# Patient Record
Sex: FEMALE | Race: WHITE | Employment: OTHER | URBAN - METROPOLITAN AREA
[De-identification: names, ages, dates, MRNs, and addresses within clinical notes are randomized per-mention and may not be internally consistent; named-entity substitution may affect disease eponyms.]

---

## 2017-03-23 ENCOUNTER — TELEPHONE (OUTPATIENT)
Dept: INTERNAL MEDICINE CLINIC | Facility: CLINIC | Age: 77
End: 2017-03-23

## 2017-03-23 DIAGNOSIS — I10 HTN, GOAL BELOW 140/90: Primary | ICD-10-CM

## 2017-03-23 DIAGNOSIS — N39.41 URGE INCONTINENCE: ICD-10-CM

## 2017-03-23 DIAGNOSIS — E78.6 LOW HDL (UNDER 40): ICD-10-CM

## 2017-03-23 NOTE — TELEPHONE ENCOUNTER
Spoke with patient and advised that labs have been ordered. Instructed to fast 8-10 hours prior to blood draw.

## 2017-03-23 NOTE — TELEPHONE ENCOUNTER
Pt coming in on 4/18 for AWV - she wants to know what labs she should do before and if she needs to fast?

## 2017-04-11 ENCOUNTER — LAB ENCOUNTER (OUTPATIENT)
Dept: LAB | Age: 77
End: 2017-04-11
Attending: INTERNAL MEDICINE
Payer: MEDICARE

## 2017-04-11 DIAGNOSIS — N39.41 URGE INCONTINENCE: ICD-10-CM

## 2017-04-11 DIAGNOSIS — E78.6 LOW HDL (UNDER 40): ICD-10-CM

## 2017-04-11 DIAGNOSIS — I10 HTN, GOAL BELOW 140/90: ICD-10-CM

## 2017-04-11 PROCEDURE — 36415 COLL VENOUS BLD VENIPUNCTURE: CPT

## 2017-04-11 PROCEDURE — 80061 LIPID PANEL: CPT

## 2017-04-11 PROCEDURE — 80053 COMPREHEN METABOLIC PANEL: CPT

## 2017-04-11 PROCEDURE — 81001 URINALYSIS AUTO W/SCOPE: CPT

## 2017-04-11 PROCEDURE — 85025 COMPLETE CBC W/AUTO DIFF WBC: CPT

## 2017-04-18 ENCOUNTER — OFFICE VISIT (OUTPATIENT)
Dept: INTERNAL MEDICINE CLINIC | Facility: CLINIC | Age: 77
End: 2017-04-18

## 2017-04-18 VITALS
TEMPERATURE: 98 F | DIASTOLIC BLOOD PRESSURE: 70 MMHG | HEART RATE: 69 BPM | BODY MASS INDEX: 33.39 KG/M2 | WEIGHT: 198 LBS | OXYGEN SATURATION: 98 % | SYSTOLIC BLOOD PRESSURE: 130 MMHG | HEIGHT: 64.5 IN | RESPIRATION RATE: 16 BRPM

## 2017-04-18 DIAGNOSIS — Z13.39 SCREENING FOR ALCOHOL PROBLEM: ICD-10-CM

## 2017-04-18 DIAGNOSIS — N39.41 URGE INCONTINENCE: ICD-10-CM

## 2017-04-18 DIAGNOSIS — Z23 NEED FOR PNEUMOCOCCAL VACCINATION: ICD-10-CM

## 2017-04-18 DIAGNOSIS — Z12.31 ENCOUNTER FOR SCREENING MAMMOGRAM FOR MALIGNANT NEOPLASM OF BREAST: ICD-10-CM

## 2017-04-18 DIAGNOSIS — I10 ESSENTIAL HYPERTENSION: ICD-10-CM

## 2017-04-18 DIAGNOSIS — Z00.00 ENCOUNTER FOR ANNUAL HEALTH EXAMINATION: ICD-10-CM

## 2017-04-18 DIAGNOSIS — Z13.31 DEPRESSION SCREENING: ICD-10-CM

## 2017-04-18 DIAGNOSIS — Z00.00 PHYSICAL EXAM, ANNUAL: Primary | ICD-10-CM

## 2017-04-18 DIAGNOSIS — E78.6 LOW HDL (UNDER 40): ICD-10-CM

## 2017-04-18 DIAGNOSIS — Z78.0 POSTMENOPAUSAL STATE: ICD-10-CM

## 2017-04-18 PROCEDURE — G0009 ADMIN PNEUMOCOCCAL VACCINE: HCPCS | Performed by: INTERNAL MEDICINE

## 2017-04-18 PROCEDURE — G0439 PPPS, SUBSEQ VISIT: HCPCS | Performed by: INTERNAL MEDICINE

## 2017-04-18 PROCEDURE — 90732 PPSV23 VACC 2 YRS+ SUBQ/IM: CPT | Performed by: INTERNAL MEDICINE

## 2017-04-18 PROCEDURE — G0444 DEPRESSION SCREEN ANNUAL: HCPCS | Performed by: INTERNAL MEDICINE

## 2017-04-18 PROCEDURE — G0442 ANNUAL ALCOHOL SCREEN 15 MIN: HCPCS | Performed by: INTERNAL MEDICINE

## 2017-04-18 RX ORDER — LOSARTAN POTASSIUM 100 MG/1
100 TABLET ORAL DAILY
Qty: 90 TABLET | Refills: 3 | Status: SHIPPED | OUTPATIENT
Start: 2017-04-18 | End: 2018-04-17

## 2017-04-18 NOTE — PATIENT INSTRUCTIONS
Eating Heart-Healthy Foods  Eating has a big impact on your heart health. In fact, eating healthier can improve several of your heart risks at once. For instance, it helps you manage weight, cholesterol, and blood pressure.  Here are ideas to help you shlomo Aim to make these foods staples of your diet. If you have diabetes, you may have different recommendations than what is listed here:  · Fruits and vegetable provide plenty of nutrients without a lot of calories.  At meals, fill half your plate with these fo · Choose ingredients that spice up your food without adding calories, fat, or sodium. Try these items: horseradish, hot sauce, lemon, mustard, nonfat salad dressings, and vinegar.  For salt-free herbs and spices, try basil, cilantro, cinnamon, pepper, and r 03/24/2014 157     TRIGLYCERIDES (mg/dL)   Date Value   04/11/2017 134   03/24/2014 91        EKG - covered if needed at Welcome to Medicare, and non-screening if indicated for medical reasons Electrocardiogram date Routine EKG is not a screening covered s Pap: Every 3 yrs age 21-65 or Pap+HPV every 5 yrs age 33-67, Covered every 2 yrs up to age 79 or Yearly if High Risk   There are no preventive care reminders to display for this patient.      Chlamydia  Annually if high risk No results found for: CHLAMYDIA Zoster (Not covered by Medicare Part B) No orders found for this or any previous visit. This may be covered with your pharmacy  prescription benefits     Recommended Websites for Advanced Directives    [email or web address]

## 2017-04-18 NOTE — PROGRESS NOTES
HPI:   Benjamin Frausto is a 78year old female who presents for a Medicare Subsequent Annual Wellness visit (Pt already had Initial Annual Wellness).     HPI:  Here for physical  No complaints  Tolerating meds  Annual Physical due on 04/18/2018        [facility] includes colonoscopy (10/28/2005); other surgical history (2010); appendectomy (2009); tonsillectomy; ; and hysterectomy (1998).     Her family history includes Carotid artery stenosis in her father; Diabetes mellitus in her brother; Bhavana Harder atraumatic   Eyes:  PERRL, conjunctiva/corneas clear, EOM's intact both eyes   Ears:  Normal TM's and external ear canals, both ears   Nose: Nares normal, septum midline,mucosa normal, no drainage or sinus tenderness   Throat: Lips, mucosa, and tongue norm S01.68/P28)    Encounter for screening mammogram for malignant neoplasm of breast  -     CELINA DIGITAL SCRN BILAT W/CAD (CPT=/63169); Future    Postmenopausal state  -     XR DEXA BONE DENSITOMETRY (CPT=77080); Future         Ms. Leni Street already takes a help    Managing money/bills: Able without help    Taking medications as prescribed: Able without help    Are you able to afford your medications?: Yes    Hearing Problems?: No     Functional Status     Hearing Problems?: No    Vision Problems? : Yes    Macy Ramos Fasting Blood Sugar (FSB)Annually   GLUCOSE (mg/dL)   Date Value   04/11/2017 91   03/24/2014 102*   ----------       Cardiovascular Disease Screening     LDL Annually LDL CHOLESTEROL (mg/dL)   Date Value   04/11/2017 93     LDL CHOLESTROL (mg/dL)   Date V Hemophiliacs who received Factor VIII or IX concentrates   Clients of institutions for the mentally retarded   Persons who live in the same house as a HepB virus carrier   Homosexual men   Illicit injectable drug abusers     Tetanus Toxoid  Only covered Rfl: 3   OXYBUTYNIN CHLORIDE ER 5 MG Oral Tablet 24 Hr TAKE ONE TABLET BY MOUTH ONCE DAILY Disp: 90 tablet Rfl: 1   Naproxen Sodium (ALEVE) 220 MG Oral Tab Take 1-2 tablets by mouth 2 (two) times daily as needed.  Disp:  Rfl:    Calcium Carbonate-Vitamin D

## 2017-05-19 ENCOUNTER — HOSPITAL ENCOUNTER (OUTPATIENT)
Dept: BONE DENSITY | Age: 77
Discharge: HOME | End: 2017-05-19
Attending: INTERNAL MEDICINE
Payer: MEDICARE

## 2017-05-19 ENCOUNTER — HOSPITAL ENCOUNTER (OUTPATIENT)
Dept: MAMMOGRAPHY | Age: 77
Discharge: HOME | End: 2017-05-19
Attending: INTERNAL MEDICINE
Payer: MEDICARE

## 2017-05-19 DIAGNOSIS — Z78.0 POSTMENOPAUSAL STATE: ICD-10-CM

## 2017-05-19 DIAGNOSIS — Z12.31 ENCOUNTER FOR SCREENING MAMMOGRAM FOR MALIGNANT NEOPLASM OF BREAST: ICD-10-CM

## 2017-05-19 PROCEDURE — 77080 DXA BONE DENSITY AXIAL: CPT | Performed by: INTERNAL MEDICINE

## 2017-05-19 PROCEDURE — 77067 SCR MAMMO BI INCL CAD: CPT | Performed by: INTERNAL MEDICINE

## 2017-06-25 DIAGNOSIS — N39.41 URGE INCONTINENCE: ICD-10-CM

## 2017-06-26 RX ORDER — OXYBUTYNIN CHLORIDE 5 MG/1
TABLET, EXTENDED RELEASE ORAL
Qty: 90 TABLET | Refills: 1 | Status: SHIPPED | OUTPATIENT
Start: 2017-06-26 | End: 2018-01-04

## 2017-07-19 ENCOUNTER — MED REC SCAN ONLY (OUTPATIENT)
Dept: INTERNAL MEDICINE CLINIC | Facility: CLINIC | Age: 77
End: 2017-07-19

## 2017-10-13 ENCOUNTER — NURSE ONLY (OUTPATIENT)
Dept: INTERNAL MEDICINE CLINIC | Facility: CLINIC | Age: 77
End: 2017-10-13

## 2017-10-13 DIAGNOSIS — Z23 FLU VACCINE NEED: Primary | ICD-10-CM

## 2017-10-13 PROCEDURE — 90686 IIV4 VACC NO PRSV 0.5 ML IM: CPT | Performed by: INTERNAL MEDICINE

## 2017-10-13 PROCEDURE — G0008 ADMIN INFLUENZA VIRUS VAC: HCPCS | Performed by: INTERNAL MEDICINE

## 2018-01-04 DIAGNOSIS — N39.41 URGE INCONTINENCE: ICD-10-CM

## 2018-01-04 RX ORDER — OXYBUTYNIN CHLORIDE 5 MG/1
TABLET, EXTENDED RELEASE ORAL
Qty: 90 TABLET | Refills: 1 | Status: SHIPPED | OUTPATIENT
Start: 2018-01-04 | End: 2018-06-24

## 2018-04-17 ENCOUNTER — OFFICE VISIT (OUTPATIENT)
Dept: INTERNAL MEDICINE CLINIC | Facility: CLINIC | Age: 78
End: 2018-04-17

## 2018-04-17 ENCOUNTER — LAB ENCOUNTER (OUTPATIENT)
Dept: LAB | Age: 78
End: 2018-04-17
Attending: INTERNAL MEDICINE
Payer: MEDICARE

## 2018-04-17 VITALS
DIASTOLIC BLOOD PRESSURE: 78 MMHG | HEART RATE: 65 BPM | OXYGEN SATURATION: 99 % | BODY MASS INDEX: 32.38 KG/M2 | HEIGHT: 64.5 IN | TEMPERATURE: 98 F | SYSTOLIC BLOOD PRESSURE: 124 MMHG | RESPIRATION RATE: 16 BRPM | WEIGHT: 192 LBS

## 2018-04-17 DIAGNOSIS — Z12.31 ENCOUNTER FOR SCREENING MAMMOGRAM FOR MALIGNANT NEOPLASM OF BREAST: ICD-10-CM

## 2018-04-17 DIAGNOSIS — E78.6 LOW HDL (UNDER 40): ICD-10-CM

## 2018-04-17 DIAGNOSIS — I10 ESSENTIAL HYPERTENSION: ICD-10-CM

## 2018-04-17 DIAGNOSIS — N39.41 URGE INCONTINENCE: ICD-10-CM

## 2018-04-17 DIAGNOSIS — Z00.00 ENCOUNTER FOR ANNUAL HEALTH EXAMINATION: ICD-10-CM

## 2018-04-17 DIAGNOSIS — H93.13 TINNITUS OF BOTH EARS: ICD-10-CM

## 2018-04-17 DIAGNOSIS — Z00.00 PHYSICAL EXAM, ANNUAL: Primary | ICD-10-CM

## 2018-04-17 PROCEDURE — 84443 ASSAY THYROID STIM HORMONE: CPT

## 2018-04-17 PROCEDURE — 85025 COMPLETE CBC W/AUTO DIFF WBC: CPT

## 2018-04-17 PROCEDURE — 36415 COLL VENOUS BLD VENIPUNCTURE: CPT

## 2018-04-17 PROCEDURE — 80061 LIPID PANEL: CPT

## 2018-04-17 PROCEDURE — G0439 PPPS, SUBSEQ VISIT: HCPCS | Performed by: INTERNAL MEDICINE

## 2018-04-17 PROCEDURE — 81001 URINALYSIS AUTO W/SCOPE: CPT

## 2018-04-17 PROCEDURE — 80053 COMPREHEN METABOLIC PANEL: CPT

## 2018-04-17 RX ORDER — LOSARTAN POTASSIUM 100 MG/1
100 TABLET ORAL DAILY
Qty: 90 TABLET | Refills: 3 | Status: SHIPPED | OUTPATIENT
Start: 2018-04-17 | End: 2019-05-26

## 2018-04-17 NOTE — PROGRESS NOTES
HPI:   Mary Lopez is a 78year old female who presents for a Medicare Subsequent Annual Wellness visit (Pt already had Initial Annual Wellness). HPI:  No complaints  Denies cp or sob  Has had some ringing in the ears for 2 weeks.  No allergy sxs no Cholesterol Labs:     Lab Results  Component Value Date   CHOLEST 165 04/11/2017   HDL 45 (L) 04/11/2017   LDL 93 04/11/2017   TRIG 134 04/11/2017          Last Chemistry Labs:     Lab Results  Component Value Date   AST 16 04/11/2017   ALT 20 04/11/2017 smokeless tobacco. She reports that she drinks alcohol. She reports that she does not use drugs.      REVIEW OF SYSTEMS:   GENERAL: feels well otherwise  SKIN: denies any unusual skin lesions  EYES: denies blurred vision or double vision  HEENT: denies nasa tenderness/mass/nodules; no carotid bruit or JVD   Back:   Symmetric, no curvature, ROM normal, no CVA tenderness   Lungs:   Clear to auscultation bilaterally, respirations unlabored   Heart:  Regular rate and rhythm, S1 and S2 normal, no murmur, rub, or g 40)- stable. monitor     Urge incontinence- stable on oxybutinin     Essential hypertension- controlled. Cont current med therapy. Check cmp     Tinnitus of both ears- cerumen removed.  Monitor for sxs improvement      The patient indicates understanding of found.    Glaucoma Screening      Ophthalmology Visit Annually: Diabetics, FHx Glaucoma, AA>50, > 65 No flowsheet data found.     Bone Density Screening      Dexascan Every two years Last Dexa Scan:   XR DEXA BONE DENSITOMETRY (CPT=77080) 05/19/2017 digoxin diuretics, anticonvulsants.)    Potassium  Annually Potassium (mmol/L)   Date Value   04/11/2017 3.9     POTASSIUM (mmol/L)   Date Value   03/24/2014 4.0   11/19/2012 4.0    No flowsheet data found.     Creatinine  Annually CREATININE (mg/dL)   Date

## 2018-05-22 ENCOUNTER — HOSPITAL ENCOUNTER (OUTPATIENT)
Dept: MAMMOGRAPHY | Age: 78
Discharge: HOME | End: 2018-05-22
Attending: INTERNAL MEDICINE
Payer: MEDICARE

## 2018-05-22 DIAGNOSIS — Z12.31 ENCOUNTER FOR SCREENING MAMMOGRAM FOR MALIGNANT NEOPLASM OF BREAST: ICD-10-CM

## 2018-05-22 PROCEDURE — 77067 SCR MAMMO BI INCL CAD: CPT | Performed by: INTERNAL MEDICINE

## 2018-06-24 DIAGNOSIS — N39.41 URGE INCONTINENCE: ICD-10-CM

## 2018-06-25 RX ORDER — OXYBUTYNIN CHLORIDE 5 MG/1
TABLET, EXTENDED RELEASE ORAL
Qty: 90 TABLET | Refills: 1 | Status: SHIPPED | OUTPATIENT
Start: 2018-06-25 | End: 2018-12-24

## 2018-10-05 ENCOUNTER — NURSE ONLY (OUTPATIENT)
Dept: INTERNAL MEDICINE CLINIC | Facility: CLINIC | Age: 78
End: 2018-10-05
Payer: MEDICARE

## 2018-10-05 DIAGNOSIS — Z23 NEED FOR INFLUENZA VACCINATION: Primary | ICD-10-CM

## 2018-10-05 PROCEDURE — G0008 ADMIN INFLUENZA VIRUS VAC: HCPCS | Performed by: INTERNAL MEDICINE

## 2018-10-05 PROCEDURE — 90653 IIV ADJUVANT VACCINE IM: CPT | Performed by: INTERNAL MEDICINE

## 2018-10-30 ENCOUNTER — OFFICE VISIT (OUTPATIENT)
Dept: INTERNAL MEDICINE CLINIC | Facility: CLINIC | Age: 78
End: 2018-10-30
Payer: MEDICARE

## 2018-10-30 VITALS
WEIGHT: 186 LBS | RESPIRATION RATE: 16 BRPM | HEART RATE: 63 BPM | DIASTOLIC BLOOD PRESSURE: 72 MMHG | HEIGHT: 64.5 IN | BODY MASS INDEX: 31.37 KG/M2 | SYSTOLIC BLOOD PRESSURE: 128 MMHG | TEMPERATURE: 98 F

## 2018-10-30 DIAGNOSIS — H81.13 BPPV (BENIGN PAROXYSMAL POSITIONAL VERTIGO), BILATERAL: ICD-10-CM

## 2018-10-30 DIAGNOSIS — H81.01 MENIERE'S DISEASE OF RIGHT EAR: Primary | ICD-10-CM

## 2018-10-30 PROCEDURE — 99213 OFFICE O/P EST LOW 20 MIN: CPT | Performed by: INTERNAL MEDICINE

## 2018-10-30 RX ORDER — TRIAMTERENE AND HYDROCHLOROTHIAZIDE 37.5; 25 MG/1; MG/1
1 CAPSULE ORAL EVERY MORNING
Qty: 90 CAPSULE | Refills: 1 | Status: SHIPPED | OUTPATIENT
Start: 2018-10-30 | End: 2019-04-20

## 2018-10-30 NOTE — PROGRESS NOTES
HPI:    Patient ID: Higinio Sebastian is a 70year old female. Dizziness   This is a recurrent problem. The current episode started more than 1 year ago. The problem occurs intermittently. The problem has been waxing and waning.  Pertinent negatives includ mouth. Disp:  Rfl:    Aspirin 81 MG Oral Tab Take 81 mg by mouth daily.  Disp:  Rfl:      Allergies:  Zocor [Simvastatin *    MYALGIA    Comment:Tabs  Zosyn                   RASH    Comment:SOLN   PHYSICAL EXAM:   Physical Exam   Constitutional: She appear

## 2018-10-30 NOTE — PATIENT INSTRUCTIONS
Meniere’s Disease    Meniere’s disease is a chronic recurring condition. It is due to a problem with the inner ear. This is the part of the ear responsible for balance as well as hearing.  Symptoms include:  · Sudden attacks of vertigo. Vertigo is a spinn If an attack is about to start or has started, take any medicine you have been prescribed for an attack. Lie down on a firm surface in a darkened room. Stay as still as possible. Stay away from bright light. Do not try to read or watch TV.  Don't get up unt · Choose low-salt snacks such as no-salt pretzels or crackers, air-popped popcorn, or low-fat frozen yogurt. · Don't use condiments high in sodium. These include mustard, relish, ketchup, soy sauce, and Worcestershire sauce.   · When you eat out, ask that · Antinausea (also called antiemetic) medicines. These help relieve nausea and vomiting. · Sedatives. These help you relax and sleep during a vertigo attack. Note: Attacks often cause nausea and vomiting.  So these drugs may be given in the form of a rect

## 2018-12-21 ENCOUNTER — TELEPHONE (OUTPATIENT)
Dept: INTERNAL MEDICINE CLINIC | Facility: CLINIC | Age: 78
End: 2018-12-21

## 2018-12-21 DIAGNOSIS — J06.9 VIRAL UPPER RESPIRATORY TRACT INFECTION: Primary | ICD-10-CM

## 2018-12-21 RX ORDER — AMOXICILLIN AND CLAVULANATE POTASSIUM 875; 125 MG/1; MG/1
1 TABLET, FILM COATED ORAL 2 TIMES DAILY
Qty: 20 TABLET | Refills: 0 | Status: SHIPPED | OUTPATIENT
Start: 2018-12-21 | End: 2018-12-31

## 2018-12-21 NOTE — TELEPHONE ENCOUNTER
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

## 2018-12-21 NOTE — TELEPHONE ENCOUNTER
Patient c/o sore throat, wet cough, chest congestion and tightness x3 days. Denies fever, N/V/D. Patient is concerned because her  has cancer and is immunosuppressed. She is afraid cough usually turns into bronchitis.

## 2018-12-21 NOTE — TELEPHONE ENCOUNTER
Dr. Aziza Darnell recommends Augmentin x10 days, tylenol/advil as needed for sore throat, rest and hydration. Should symptoms persist. Patient is to follow up in the office. Patient aware and agreed with plan.    Rx sent     Patient allergic to Zosyn when given

## 2018-12-23 DIAGNOSIS — N39.41 URGE INCONTINENCE: ICD-10-CM

## 2018-12-24 RX ORDER — OXYBUTYNIN CHLORIDE 5 MG/1
TABLET, EXTENDED RELEASE ORAL
Qty: 90 TABLET | Refills: 1 | Status: SHIPPED | OUTPATIENT
Start: 2018-12-24 | End: 2019-06-12

## 2019-03-06 ENCOUNTER — TELEPHONE (OUTPATIENT)
Dept: INTERNAL MEDICINE CLINIC | Facility: CLINIC | Age: 79
End: 2019-03-06

## 2019-03-06 DIAGNOSIS — E78.6 LOW HDL (UNDER 40): Primary | ICD-10-CM

## 2019-03-06 DIAGNOSIS — H81.01 MENIERE'S DISEASE OF RIGHT EAR: ICD-10-CM

## 2019-03-06 DIAGNOSIS — I10 ESSENTIAL HYPERTENSION: ICD-10-CM

## 2019-04-09 ENCOUNTER — LAB ENCOUNTER (OUTPATIENT)
Dept: LAB | Age: 79
End: 2019-04-09
Attending: INTERNAL MEDICINE
Payer: MEDICARE

## 2019-04-09 DIAGNOSIS — H81.01 MENIERE'S DISEASE OF RIGHT EAR: ICD-10-CM

## 2019-04-09 DIAGNOSIS — I10 ESSENTIAL HYPERTENSION: ICD-10-CM

## 2019-04-09 DIAGNOSIS — E78.6 LOW HDL (UNDER 40): ICD-10-CM

## 2019-04-09 PROCEDURE — 81001 URINALYSIS AUTO W/SCOPE: CPT

## 2019-04-09 PROCEDURE — 84443 ASSAY THYROID STIM HORMONE: CPT

## 2019-04-09 PROCEDURE — 85025 COMPLETE CBC W/AUTO DIFF WBC: CPT

## 2019-04-09 PROCEDURE — 36415 COLL VENOUS BLD VENIPUNCTURE: CPT

## 2019-04-09 PROCEDURE — 80061 LIPID PANEL: CPT

## 2019-04-09 PROCEDURE — 80053 COMPREHEN METABOLIC PANEL: CPT

## 2019-04-16 ENCOUNTER — OFFICE VISIT (OUTPATIENT)
Dept: INTERNAL MEDICINE CLINIC | Facility: CLINIC | Age: 79
End: 2019-04-16
Payer: MEDICARE

## 2019-04-16 VITALS
DIASTOLIC BLOOD PRESSURE: 76 MMHG | HEART RATE: 64 BPM | SYSTOLIC BLOOD PRESSURE: 122 MMHG | HEIGHT: 64.5 IN | BODY MASS INDEX: 32.38 KG/M2 | RESPIRATION RATE: 16 BRPM | WEIGHT: 192 LBS | TEMPERATURE: 98 F

## 2019-04-16 DIAGNOSIS — Z00.00 ENCOUNTER FOR ANNUAL HEALTH EXAMINATION: ICD-10-CM

## 2019-04-16 DIAGNOSIS — G25.81 RLS (RESTLESS LEGS SYNDROME): ICD-10-CM

## 2019-04-16 DIAGNOSIS — I10 ESSENTIAL HYPERTENSION: ICD-10-CM

## 2019-04-16 DIAGNOSIS — Z00.00 ANNUAL PHYSICAL EXAM: Primary | ICD-10-CM

## 2019-04-16 DIAGNOSIS — N39.41 URGE INCONTINENCE: ICD-10-CM

## 2019-04-16 DIAGNOSIS — H81.01 MENIERE'S DISEASE OF RIGHT EAR: ICD-10-CM

## 2019-04-16 DIAGNOSIS — Z12.31 ENCOUNTER FOR SCREENING MAMMOGRAM FOR MALIGNANT NEOPLASM OF BREAST: ICD-10-CM

## 2019-04-16 DIAGNOSIS — E78.6 LOW HDL (UNDER 40): ICD-10-CM

## 2019-04-16 PROBLEM — H93.13 TINNITUS OF BOTH EARS: Status: RESOLVED | Noted: 2018-04-17 | Resolved: 2019-04-16

## 2019-04-16 PROCEDURE — G0439 PPPS, SUBSEQ VISIT: HCPCS | Performed by: INTERNAL MEDICINE

## 2019-04-16 RX ORDER — ROPINIROLE 0.5 MG/1
0.5 TABLET, FILM COATED ORAL EVERY EVENING
Qty: 90 TABLET | Refills: 1 | Status: SHIPPED | OUTPATIENT
Start: 2019-04-16 | End: 2019-09-24

## 2019-04-16 NOTE — PROGRESS NOTES
HPI:   Higinio Sebastian is a 70year old female who presents for a Medicare Subsequent Annual Wellness visit (Pt already had Initial Annual Wellness). HPI:  Here for AWV  Reports continues vertigo and tinnitus despite diuretics.   Reports worsening urinar 186 lb  04/17/18 : 192 lb     Last Cholesterol Labs:   Lab Results   Component Value Date    CHOLEST 173 04/09/2019    HDL 53 04/09/2019     (H) 04/09/2019    TRIG 90 04/09/2019          Last Chemistry Labs:   Lab Results   Component Value Date    A Diabetes mellitus in her brother; MI in her father; Valvular heart disease, htn in her brother; crest syndrome in her mother. SOCIAL HISTORY:   She  reports that she has never smoked.  She has never used smokeless tobacco. She reports that she drinks alco mucosa, and tongue normal; teeth and gums normal   Neck: Supple, symmetrical, trachea midline, no adenopathy;  thyroid: not enlarged, symmetric, no tenderness/mass/nodules; no carotid bruit or JVD   Back:   Symmetric, no curvature, ROM normal, no CVA tende [facility] SCREENING BILAT (CPT=77067); Future         Diet assessment: good     PLAN:       Low HDL (under 40)- stable. monitor     Urge incontinence- refer to urogyne for eval     Essential hypertension- controlled.  Cont current med therapy     Meniere's disease found for this or any previous visit. No flowsheet data found. Fecal Occult Blood Annually No results found for: FOB No flowsheet data found.     Glaucoma Screening      Ophthalmology Visit Annually: Diabetics, FHx Glaucoma, AA>50, > 65 No flows SPECIFIC DISEASE MONITORING Internal Lab or Procedure External Lab or Procedure      Annual Monitoring of Persistent     Medications (ACE/ARB, digoxin diuretics, anticonvulsants.)    Potassium  Annually Potassium (mmol/L)   Date Value   04/09/2019 4.2 (restless legs syndrome)  Encounter for screening mammogram for malignant neoplasm of breast    No orders of the defined types were placed in this encounter.       Meds This Visit:  Requested Prescriptions     Signed Prescriptions Disp Refills   • rOPINIRol

## 2019-04-16 NOTE — PATIENT INSTRUCTIONS
[facility] SCREENING SCHEDULE   Tests on this list are recommended by your physician but may not be covered, or covered at this frequency, by your insurer. Please check with your insurance carrier before scheduling to verify coverage.    PREVENTATI • Men who are 73-68 years old and have smoked more than 100 cigarettes in their lifetime   • Anyone with a family history    Colorectal Cancer Screening  Covered up to Age 76     Colonoscopy Screen   Covered every 10 years- more often if abnormal Colonos Influenza  Covered Annually Orders placed or performed in visit on 10/13/17   • INFLUENZA VIRUS VACCINE, QUAD, PRESERVATIVE FREE, 0.5 ML   Orders placed or performed in visit on 10/07/16   • FLU VAC NO PRSV 4 JOVANY 3 YRS+   Orders placed or performed in visi to the [employer]. This site has a lot of good information including definitions of the different types of Advance Directives.  It also has the State forms available on it's website for anyone to review and print using their home compute

## 2019-04-20 DIAGNOSIS — H81.01 MENIERE'S DISEASE OF RIGHT EAR: ICD-10-CM

## 2019-04-22 RX ORDER — TRIAMTERENE AND HYDROCHLOROTHIAZIDE 37.5; 25 MG/1; MG/1
CAPSULE ORAL
Qty: 90 CAPSULE | Refills: 1 | Status: SHIPPED | OUTPATIENT
Start: 2019-04-22 | End: 2019-05-13 | Stop reason: ALTCHOICE

## 2019-04-30 ENCOUNTER — OFFICE VISIT (OUTPATIENT)
Dept: UROLOGY | Facility: HOSPITAL | Age: 79
End: 2019-04-30
Attending: OBSTETRICS & GYNECOLOGY
Payer: MEDICARE

## 2019-04-30 VITALS
DIASTOLIC BLOOD PRESSURE: 80 MMHG | SYSTOLIC BLOOD PRESSURE: 126 MMHG | HEIGHT: 64.5 IN | BODY MASS INDEX: 32.38 KG/M2 | WEIGHT: 192 LBS

## 2019-04-30 DIAGNOSIS — N39.3 FEMALE STRESS INCONTINENCE: ICD-10-CM

## 2019-04-30 DIAGNOSIS — N81.11 CYSTOCELE, MIDLINE: ICD-10-CM

## 2019-04-30 DIAGNOSIS — N81.84 PELVIC MUSCLE WASTING: ICD-10-CM

## 2019-04-30 DIAGNOSIS — R35.1 NOCTURIA: ICD-10-CM

## 2019-04-30 DIAGNOSIS — N39.41 URGE INCONTINENCE: Primary | ICD-10-CM

## 2019-04-30 DIAGNOSIS — N95.2 POSTMENOPAUSAL ATROPHIC VAGINITIS: ICD-10-CM

## 2019-04-30 DIAGNOSIS — K59.00 CONSTIPATION, UNSPECIFIED CONSTIPATION TYPE: ICD-10-CM

## 2019-04-30 PROCEDURE — 81003 URINALYSIS AUTO W/O SCOPE: CPT | Performed by: OBSTETRICS & GYNECOLOGY

## 2019-04-30 PROCEDURE — 87086 URINE CULTURE/COLONY COUNT: CPT | Performed by: OBSTETRICS & GYNECOLOGY

## 2019-04-30 PROCEDURE — 99201 HC OUTPT EVAL AND MGNT NEW PT LEVEL 1: CPT

## 2019-04-30 NOTE — PATIENT INSTRUCTIONS
STOP OXYBUTYNIN FOR [address]  [facility]  [address] [place] [address],[address]  Prasanna England   Office: [phone]  Fax: [phone]  [email or web address]    What is Urodynamic Testing?   Your physician or specia the bladder fills, the different pressure measurements will be recorded and you will be asked to cough and push or bear down to check for any leakage. You will also be asked about the way your bladder feels as it is filling.   Small sticky patches are ofte diarrhea.      _____Milk of Magnesia    Begin with 1 teaspoon every evening. This is a very low dose---approximately one-sixth of the typical dose. You may need to increase the amount depending on your results.       XX Miralax - as needed    Miralax is a

## 2019-04-30 NOTE — PROGRESS NOTES
Cindi Vargas DO  4/30/2019     Referred by Dr. Rozina Villaseñor    Patient presents with:   Incontinence: referred by Dr. Rozina Villaseñor, leaking urine with activity      HPI:  +CYNDI, some  +UUI  UUI > CYNDI  Nocturia x 1-2 (recently stopped diuretic and less nocturia)    hyst CHLORIDE ER 5 MG Oral Tablet 24 Hr TAKE 1 TABLET BY MOUTH ONCE DAILY Disp: 90 tablet Rfl: 1   losartan 100 MG Oral Tab Take 1 tablet (100 mg total) by mouth daily.  Disp: 90 tablet Rfl: 3   Naproxen Sodium (ALEVE) 220 MG Oral Tab Take 1-2 tablets by mouth 2 hypermobile    Impression/Plan:  (N39.41) Urge incontinence  (primary encounter diagnosis)  Plan: URINALYSIS, ROUTINE, URINE CULTURE, ROUTINE,         POCT URINALYSIS DIPSTICK    (N39.3) Female stress incontinence  Plan: URINALYSIS, ROUTINE, URINE CULTURE, discussed  Anterior repair  Mid-urethral slings (Trans Vaginal Taping, TOT, single-incision)    Pt verbalizes understanding of all above discussed information. All questions answered. She agrees to plan    Return for UDS (stop oxybutynin), follow up.     Oliverio

## 2019-05-06 ENCOUNTER — MED REC SCAN ONLY (OUTPATIENT)
Dept: INTERNAL MEDICINE CLINIC | Facility: CLINIC | Age: 79
End: 2019-05-06

## 2019-05-13 ENCOUNTER — TELEPHONE (OUTPATIENT)
Dept: INTERNAL MEDICINE CLINIC | Facility: CLINIC | Age: 79
End: 2019-05-13

## 2019-05-13 NOTE — TELEPHONE ENCOUNTER
TREVER pt stopped her Triamterene because she went and seen Dr. Anton Levin and he told her she does not have Meniere's disease, no need for a call back      Per Dr. Indy Kenney telephone encounter and MA's note ENT advised pt stop Triamterene.

## 2019-05-15 ENCOUNTER — NURSE ONLY (OUTPATIENT)
Dept: INTERNAL MEDICINE CLINIC | Facility: CLINIC | Age: 79
End: 2019-05-15
Payer: MEDICARE

## 2019-05-15 DIAGNOSIS — Z01.818 PRE-OP TESTING: Primary | ICD-10-CM

## 2019-05-15 DIAGNOSIS — I10 ESSENTIAL HYPERTENSION: ICD-10-CM

## 2019-05-15 PROBLEM — H81.01 MENIERE'S DISEASE OF RIGHT EAR: Status: RESOLVED | Noted: 2018-10-30 | Resolved: 2019-05-15

## 2019-05-15 PROCEDURE — 93000 ELECTROCARDIOGRAM COMPLETE: CPT | Performed by: NURSE PRACTITIONER

## 2019-05-15 NOTE — PROGRESS NOTES
EKG review- NSR, no specific ST elevation or depression-  ms, QRS 78 ms, QT 39ms.  Ok to proceed with operation

## 2019-05-22 PROBLEM — S86.012A RUPTURE OF LEFT ACHILLES TENDON, INITIAL ENCOUNTER: Status: ACTIVE | Noted: 2019-05-22

## 2019-05-26 DIAGNOSIS — I10 ESSENTIAL HYPERTENSION: ICD-10-CM

## 2019-05-28 RX ORDER — LOSARTAN POTASSIUM 100 MG/1
TABLET ORAL
Qty: 90 TABLET | Refills: 3 | Status: SHIPPED | OUTPATIENT
Start: 2019-05-28 | End: 2020-05-04

## 2019-06-12 DIAGNOSIS — N39.41 URGE INCONTINENCE: ICD-10-CM

## 2019-06-12 RX ORDER — OXYBUTYNIN CHLORIDE 5 MG/1
TABLET, EXTENDED RELEASE ORAL
Qty: 90 TABLET | Refills: 1 | Status: SHIPPED | OUTPATIENT
Start: 2019-06-12 | End: 2019-09-24 | Stop reason: ALTCHOICE

## 2019-08-14 ENCOUNTER — HOSPITAL ENCOUNTER (OUTPATIENT)
Dept: MAMMOGRAPHY | Age: 79
Discharge: HOME | End: 2019-08-14
Attending: INTERNAL MEDICINE
Payer: MEDICARE

## 2019-08-14 DIAGNOSIS — Z12.31 ENCOUNTER FOR SCREENING MAMMOGRAM FOR MALIGNANT NEOPLASM OF BREAST: ICD-10-CM

## 2019-08-14 PROCEDURE — 77063 BREAST TOMOSYNTHESIS BI: CPT | Performed by: INTERNAL MEDICINE

## 2019-08-14 PROCEDURE — 77067 SCR MAMMO BI INCL CAD: CPT | Performed by: INTERNAL MEDICINE

## 2019-09-11 ENCOUNTER — OFFICE VISIT (OUTPATIENT)
Dept: UROLOGY | Facility: HOSPITAL | Age: 79
End: 2019-09-11
Attending: OBSTETRICS & GYNECOLOGY
Payer: MEDICARE

## 2019-09-11 VITALS — HEIGHT: 65 IN | WEIGHT: 194 LBS | BODY MASS INDEX: 32.32 KG/M2

## 2019-09-11 DIAGNOSIS — N81.11 CYSTOCELE, MIDLINE: ICD-10-CM

## 2019-09-11 DIAGNOSIS — R35.1 NOCTURIA: ICD-10-CM

## 2019-09-11 DIAGNOSIS — N39.3 FEMALE STRESS INCONTINENCE: ICD-10-CM

## 2019-09-11 DIAGNOSIS — N39.41 URGE INCONTINENCE: Primary | ICD-10-CM

## 2019-09-11 LAB
CONTROL RUN WITHIN 24 HOURS?: YES
LEUKOCYTE ESTERASE URINE: NEGATIVE
NITRITE URINE: NEGATIVE

## 2019-09-11 PROCEDURE — 51741 ELECTRO-UROFLOWMETRY FIRST: CPT

## 2019-09-11 PROCEDURE — 51797 INTRAABDOMINAL PRESSURE TEST: CPT

## 2019-09-11 PROCEDURE — 51729 CYSTOMETROGRAM W/VP&UP: CPT

## 2019-09-11 PROCEDURE — 51784 ANAL/URINARY MUSCLE STUDY: CPT

## 2019-09-11 NOTE — PROCEDURES
Patient here for urodynamic testing. Procedure explained and confirmed by patient. See evaluation form for results. Both verbal and written discharge instructions were given.   Patient tolerated procedure well and will follow up with Dr. Keshav Coreas Catheter:     Fr 7 / tdoc   Infusion:  Water Rate 50 mL/min  Temp:  Room  Position:  [x]  Sit  []  Stand  []  Supine  First sensation:   13 mL  First desire to void:   58 mL  Strong desire to void:  96 mL  Maximum cystometric capacity:   187 mL  Detrusor A

## 2019-09-11 NOTE — PATIENT INSTRUCTIONS
[address]  [id number]  [facility]: [phone]  FAX: [phone]       Urodynamic Testing Discharge Instructions: There are NO dietary or activity restrictions.   You may resume

## 2019-09-12 PROBLEM — M25.561 RIGHT KNEE PAIN, UNSPECIFIED CHRONICITY: Status: ACTIVE | Noted: 2019-09-12

## 2019-09-12 PROBLEM — M54.50 LOW BACK PAIN, UNSPECIFIED BACK PAIN LATERALITY, UNSPECIFIED CHRONICITY, UNSPECIFIED WHETHER SCIATICA PRESENT: Status: ACTIVE | Noted: 2019-09-12

## 2019-09-12 PROBLEM — M25.551 RIGHT HIP PAIN: Status: ACTIVE | Noted: 2019-09-12

## 2019-09-23 ENCOUNTER — HOSPITAL ENCOUNTER (OUTPATIENT)
Dept: MRI IMAGING | Age: 79
Discharge: HOME | End: 2019-09-23
Attending: PHYSICIAN ASSISTANT
Payer: MEDICARE

## 2019-09-23 DIAGNOSIS — M54.16 LUMBAR RADICULOPATHY: ICD-10-CM

## 2019-09-23 DIAGNOSIS — M47.816 LUMBAR SPONDYLOSIS: ICD-10-CM

## 2019-09-23 DIAGNOSIS — M43.16 SPONDYLOLISTHESIS, LUMBAR REGION: ICD-10-CM

## 2019-09-23 PROCEDURE — 72148 MRI LUMBAR SPINE W/O DYE: CPT | Performed by: PHYSICIAN ASSISTANT

## 2019-09-24 ENCOUNTER — TELEPHONE (OUTPATIENT)
Dept: UROLOGY | Facility: HOSPITAL | Age: 79
End: 2019-09-24

## 2019-09-24 ENCOUNTER — OFFICE VISIT (OUTPATIENT)
Dept: UROLOGY | Facility: HOSPITAL | Age: 79
End: 2019-09-24
Attending: OBSTETRICS & GYNECOLOGY
Payer: MEDICARE

## 2019-09-24 VITALS
WEIGHT: 194 LBS | DIASTOLIC BLOOD PRESSURE: 90 MMHG | BODY MASS INDEX: 32.32 KG/M2 | SYSTOLIC BLOOD PRESSURE: 140 MMHG | HEIGHT: 65 IN

## 2019-09-24 DIAGNOSIS — N81.11 CYSTOCELE, MIDLINE: ICD-10-CM

## 2019-09-24 DIAGNOSIS — R33.9 INCOMPLETE BLADDER EMPTYING: ICD-10-CM

## 2019-09-24 DIAGNOSIS — N39.41 URGE INCONTINENCE: Primary | ICD-10-CM

## 2019-09-24 DIAGNOSIS — N32.81 DETRUSOR INSTABILITY: ICD-10-CM

## 2019-09-24 PROCEDURE — 99211 OFF/OP EST MAY X REQ PHY/QHP: CPT

## 2019-09-24 RX ORDER — TROSPIUM CHLORIDE ER 60 MG/1
60 CAPSULE ORAL DAILY
Qty: 90 CAPSULE | Refills: 3 | Status: SHIPPED | OUTPATIENT
Start: 2019-09-24 | End: 2021-01-26

## 2019-09-24 NOTE — PATIENT INSTRUCTIONS
Keep bowels moving  Stop oxybutynin, start trospium XR 60mg q AM  Double void  Pursue pelvic floor PT    [facility] [address] REGIMEN    Constipation can have detrimental effects on bladder function and can w

## 2019-09-24 NOTE — TELEPHONE ENCOUNTER
Spoke to [address] [place], [place] [address]y 74  For a prior [employer]. Ref.  # [id number] phone number to check status  [facility] will notify via fax 48-72 hours  Trospium chloride ER 60mg is non formulary, on formulary are myrbetriq ER,toviaz ER, Oxybutyn

## 2019-09-24 NOTE — PROGRESS NOTES
Pt presents w/ initial c/o UUI  Urodynamic testing undergone without complication.   Results reviewed with patient  10/200cc & 266/100cc  [facility] 187cc  DO @ 97cc  No CYNDI    Discussed with patient mgmt options for DO, incomplete bladder emptying  Taking oxybuty

## 2019-10-02 ENCOUNTER — TELEPHONE (OUTPATIENT)
Dept: UROLOGY | Facility: HOSPITAL | Age: 79
End: 2019-10-02

## 2019-10-02 NOTE — TELEPHONE ENCOUNTER
Called pt to verify her prior authorization went through and was able to get her medication. Pt states she did receive authorization for the medication and received it this past Monday and started taking it right away Monday.    Instructed to call with any

## 2019-10-09 ENCOUNTER — OFFICE VISIT (OUTPATIENT)
Dept: INTERNAL MEDICINE CLINIC | Facility: CLINIC | Age: 79
End: 2019-10-09
Payer: MEDICARE

## 2019-10-09 VITALS
TEMPERATURE: 98 F | HEART RATE: 59 BPM | HEIGHT: 64 IN | RESPIRATION RATE: 16 BRPM | WEIGHT: 199 LBS | DIASTOLIC BLOOD PRESSURE: 80 MMHG | BODY MASS INDEX: 33.97 KG/M2 | SYSTOLIC BLOOD PRESSURE: 130 MMHG

## 2019-10-09 DIAGNOSIS — Z23 NEED FOR INFLUENZA VACCINATION: ICD-10-CM

## 2019-10-09 DIAGNOSIS — I10 ESSENTIAL HYPERTENSION: Primary | ICD-10-CM

## 2019-10-09 PROBLEM — M51.36 DDD (DEGENERATIVE DISC DISEASE), LUMBAR: Status: ACTIVE | Noted: 2019-10-09

## 2019-10-09 PROBLEM — M25.561 RIGHT KNEE PAIN, UNSPECIFIED CHRONICITY: Status: RESOLVED | Noted: 2019-09-12 | Resolved: 2019-10-09

## 2019-10-09 PROBLEM — M51.369 DDD (DEGENERATIVE DISC DISEASE), LUMBAR: Status: ACTIVE | Noted: 2019-10-09

## 2019-10-09 PROBLEM — M25.551 RIGHT HIP PAIN: Status: RESOLVED | Noted: 2019-09-12 | Resolved: 2019-10-09

## 2019-10-09 PROBLEM — M54.50 LOW BACK PAIN, UNSPECIFIED BACK PAIN LATERALITY, UNSPECIFIED CHRONICITY, UNSPECIFIED WHETHER SCIATICA PRESENT: Status: RESOLVED | Noted: 2019-09-12 | Resolved: 2019-10-09

## 2019-10-09 PROCEDURE — G0008 ADMIN INFLUENZA VIRUS VAC: HCPCS | Performed by: INTERNAL MEDICINE

## 2019-10-09 PROCEDURE — 90662 IIV NO PRSV INCREASED AG IM: CPT | Performed by: INTERNAL MEDICINE

## 2019-10-09 PROCEDURE — 99213 OFFICE O/P EST LOW 20 MIN: CPT | Performed by: INTERNAL MEDICINE

## 2019-10-09 NOTE — PROGRESS NOTES
HPI:    Patient ID: Jorge Narayan is a 70year old female. Hypertension       Jorge Narayan is a 70year old female. HPI:   Patient presents for recheck of her hypertension.  Pt has been taking medications as instructed, no medication side effect Diagnosis Date   • Actinic keratosis    • Displacement of lumbar intervertebral disc without myelopathy     L3-L4   • Hypertonicity of bladder    • Microscopic hematuria    • Osteoarthritis    • Prediabetes    • Unspecified hemorrhoids with other complic clear  NECK: supple,no adenopathy,no bruits  LUNGS: clear to auscultation  CARDIO: RRR without murmur  GI: good BS's,no masses, HSM or tenderness  EXTREMITIES: no cyanosis, clubbing or edema    ASSESSMENT AND PLAN:   Pt presents for a recheck of her hypert

## 2019-10-21 ENCOUNTER — MED REC SCAN ONLY (OUTPATIENT)
Dept: INTERNAL MEDICINE CLINIC | Facility: CLINIC | Age: 79
End: 2019-10-21

## 2019-10-22 PROBLEM — M48.061 LUMBAR FORAMINAL STENOSIS: Status: ACTIVE | Noted: 2019-10-22

## 2020-01-09 PROBLEM — M54.16 LUMBAR RADICULITIS: Status: ACTIVE | Noted: 2020-01-09

## 2020-01-09 PROBLEM — M25.561 RECURRENT PAIN OF RIGHT KNEE: Status: ACTIVE | Noted: 2019-09-12

## 2020-01-27 ENCOUNTER — OFFICE VISIT (OUTPATIENT)
Dept: INTERNAL MEDICINE CLINIC | Facility: CLINIC | Age: 80
End: 2020-01-27
Payer: MEDICARE

## 2020-01-27 VITALS
SYSTOLIC BLOOD PRESSURE: 130 MMHG | TEMPERATURE: 98 F | BODY MASS INDEX: 34.49 KG/M2 | WEIGHT: 202 LBS | RESPIRATION RATE: 18 BRPM | HEIGHT: 64 IN | HEART RATE: 94 BPM | OXYGEN SATURATION: 96 % | DIASTOLIC BLOOD PRESSURE: 60 MMHG

## 2020-01-27 DIAGNOSIS — J18.9 ATYPICAL PNEUMONIA: Primary | ICD-10-CM

## 2020-01-27 DIAGNOSIS — J98.01 BRONCHOSPASM: ICD-10-CM

## 2020-01-27 PROBLEM — M54.16 LUMBAR RADICULITIS: Status: RESOLVED | Noted: 2020-01-09 | Resolved: 2020-01-27

## 2020-01-27 PROCEDURE — 99214 OFFICE O/P EST MOD 30 MIN: CPT | Performed by: INTERNAL MEDICINE

## 2020-01-27 RX ORDER — PREDNISONE 20 MG/1
TABLET ORAL
Qty: 14 TABLET | Refills: 0 | Status: SHIPPED | OUTPATIENT
Start: 2020-01-27 | End: 2020-04-28 | Stop reason: ALTCHOICE

## 2020-01-27 RX ORDER — ALBUTEROL SULFATE 90 UG/1
2 AEROSOL, METERED RESPIRATORY (INHALATION) EVERY 6 HOURS
Qty: 1 INHALER | Refills: 0 | Status: SHIPPED | OUTPATIENT
Start: 2020-01-27 | End: 2020-04-28 | Stop reason: ALTCHOICE

## 2020-01-27 RX ORDER — AMOXICILLIN 875 MG/1
875 TABLET, COATED ORAL 2 TIMES DAILY
Qty: 20 TABLET | Refills: 0 | Status: SHIPPED | OUTPATIENT
Start: 2020-01-27 | End: 2020-04-28 | Stop reason: ALTCHOICE

## 2020-01-27 NOTE — PROGRESS NOTES
HPI:    Patient ID: Marilia Bowling is a 70year old female. Cough   This is a new problem. The current episode started 1 to 4 weeks ago. The problem has been gradually worsening. The problem occurs every few minutes.  The cough is productive of purulent daily as needed. • Calcium Carbonate-Vitamin D (CALCIUM + D) 600-200 MG-UNIT Oral Tab Take 1 tablet by mouth daily. • Multiple Vitamin (MULTIVITAMINS OR) Take  by mouth. • Aspirin 81 MG Oral Tab Take 81 mg by mouth daily.        Allergies:  Zo Referrals:  None       #[id number]

## 2020-01-28 ENCOUNTER — OFFICE VISIT (OUTPATIENT)
Dept: PHYSICAL THERAPY | Age: 80
End: 2020-01-28
Attending: OBSTETRICS & GYNECOLOGY
Payer: MEDICARE

## 2020-01-28 ENCOUNTER — TELEPHONE (OUTPATIENT)
Dept: UROLOGY | Facility: HOSPITAL | Age: 80
End: 2020-01-28

## 2020-01-28 DIAGNOSIS — R33.9 INCOMPLETE BLADDER EMPTYING: ICD-10-CM

## 2020-01-28 DIAGNOSIS — N81.11 CYSTOCELE, MIDLINE: ICD-10-CM

## 2020-01-28 DIAGNOSIS — N39.41 URGE INCONTINENCE: ICD-10-CM

## 2020-01-28 DIAGNOSIS — N32.81 DETRUSOR INSTABILITY OF BLADDER: ICD-10-CM

## 2020-01-28 DIAGNOSIS — N39.41 URGE INCONTINENCE: Primary | ICD-10-CM

## 2020-01-28 PROCEDURE — 97161 PT EVAL LOW COMPLEX 20 MIN: CPT

## 2020-01-28 PROCEDURE — 97110 THERAPEUTIC EXERCISES: CPT

## 2020-01-28 NOTE — PROGRESS NOTES
MUSCULOSKELETAL AND PELVIC FLOOR EVALUATION:   Referring Physician: Dr. 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)  Cystocele, midline (N81.11)  Incomplete bladder emptying (R33.9)  Date of Service: 1/28/2 symptoms: stress incontinence and urge incontinence  Events associated with the onset of urinary complaints: urgency, frequency, urge UI with full bladder, CYNDI with full bladder including cough, laugh, sneeze and lifting  Abdominal/Vaginal Pressure complai WNL  Introitus: WNL  Perineal body: WNL    Sensory/Reflex:  Vestibule: normal bilaterally  Anal Hill: normal bilaterally    Internal Examination   Scar: Not significant     Pelvic Floor Muscle strength: (PERF= Power/Endurance/Reps/Fast) MMT: 2+/5/10 /Not p agreed to actively participate in planning and for this course of care. Thank you for your referral. Please co-sign or sign and return this letter via fax as soon as possible to [phone].  If you have any questions, please contact me at Dept: [phone]

## 2020-01-30 ENCOUNTER — OFFICE VISIT (OUTPATIENT)
Dept: PHYSICAL THERAPY | Age: 80
End: 2020-01-30
Attending: OBSTETRICS & GYNECOLOGY
Payer: MEDICARE

## 2020-01-30 DIAGNOSIS — N39.41 URGE INCONTINENCE: ICD-10-CM

## 2020-01-30 DIAGNOSIS — N81.11 CYSTOCELE, MIDLINE: ICD-10-CM

## 2020-01-30 DIAGNOSIS — R33.9 INCOMPLETE BLADDER EMPTYING: ICD-10-CM

## 2020-01-30 DIAGNOSIS — N32.81 DETRUSOR INSTABILITY: ICD-10-CM

## 2020-01-30 PROCEDURE — 97110 THERAPEUTIC EXERCISES: CPT

## 2020-01-30 PROCEDURE — 97112 NEUROMUSCULAR REEDUCATION: CPT

## 2020-01-30 NOTE — PROGRESS NOTES
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 Governor Rojas  Next MD visit: none HEP: 1/30/2020 : PF isometric.  PF pre-activation : hip add thomas, hip ER seated    Charges:  Ex2 ( 30 min) NMRED 1 ( 15 min)        Total Timed Treatment: 45 min  Total Treatment Time: 45 min         MUSCULOSKELETAL AND PELVIC FLOOR EVALUATION:   Referring Past medical history was reviewed with Raya.  Significant findings include atypical pneumonia and bronchospasm; LBP; hemorrhoids, prediabetes, OA    Precautions:  Drug Allergy    URINARY HABITS  Types of symptoms: stress incontinence and urge incontinence Voluntary contraction: present   Voluntary relaxation: present  Involuntary contraction: present  Involuntary relaxation: present    Mons pubis: WNL  Labia majora: WNL  Labia minora:  WNL  Urethral meatus: WNL  Introitus: WNL  Perineal body: WNL    Sensory/ Frequency / Duration: Patient will be seen for 2 x/week  For first two week and then progress to 1 x week or a total of 8 visits over a 90 day period.   Treatment will include: Neuromuscular Re-education, Therapeutic Activities, Therapeutic Exercise and Jorje

## 2020-02-04 ENCOUNTER — OFFICE VISIT (OUTPATIENT)
Dept: PHYSICAL THERAPY | Age: 80
End: 2020-02-04
Attending: OBSTETRICS & GYNECOLOGY
Payer: MEDICARE

## 2020-02-04 DIAGNOSIS — N32.81 DETRUSOR INSTABILITY: ICD-10-CM

## 2020-02-04 DIAGNOSIS — N39.41 URGE INCONTINENCE: ICD-10-CM

## 2020-02-04 DIAGNOSIS — R33.9 INCOMPLETE BLADDER EMPTYING: ICD-10-CM

## 2020-02-04 DIAGNOSIS — N81.11 CYSTOCELE, MIDLINE: ICD-10-CM

## 2020-02-04 PROCEDURE — 97112 NEUROMUSCULAR REEDUCATION: CPT

## 2020-02-04 NOTE — PROGRESS NOTES
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 Oleg Gomez  Next MD visit: none Patient will demonstrate pelvic floor contraction average 10.0 mvc in 10 sec and 10 reps to decrease urinary incontinence.        Plan: Patient will continue with PT neuromuscular re-education for to improve pelvic floor awareness and apply urge deferment e Yolanda Alston is a 70year old female  who presents to therapy today with complaints of urgency, frequency, urge UI and some CYNDI. She noticed some improvements since starting Tropsium. Her voiding intervals improved and less urgency events.  She still fee Episodes of Leakage: Can be unknown, wetness found on panty liner  Amount of leakage: Small. Rare large amount.    Pad use: panty liner  Pad Change frequency: Couple times a day  Patient instructed in bladder diary  Do you ever leak urine without knowing it Pelvic Floor Muscle strength: (PERF= Power/Endurance/Reps/Fast) MMT: 2+/5/10 /Not performed   External Anal Sphincter: not tested at this time  Accessory Muscle Use: gluteals, adductors, abdominals    Tissue Laxity Test:  Anterior Wall: WNL  Posterior Wall Thank you for your referral. Please co-sign or sign and return this letter via fax as soon as possible to [phone].  If you have any questions, please contact me at Dept: [phone]    Sincerely,  Electronically signed by therapist: Anjum Delaney PT

## 2020-02-06 ENCOUNTER — OFFICE VISIT (OUTPATIENT)
Dept: PHYSICAL THERAPY | Age: 80
End: 2020-02-06
Attending: OBSTETRICS & GYNECOLOGY
Payer: MEDICARE

## 2020-02-06 DIAGNOSIS — N32.81 DETRUSOR INSTABILITY: ICD-10-CM

## 2020-02-06 DIAGNOSIS — R33.9 INCOMPLETE BLADDER EMPTYING: ICD-10-CM

## 2020-02-06 DIAGNOSIS — N39.41 URGE INCONTINENCE: ICD-10-CM

## 2020-02-06 DIAGNOSIS — N81.11 CYSTOCELE, MIDLINE: ICD-10-CM

## 2020-02-06 PROCEDURE — 97112 NEUROMUSCULAR REEDUCATION: CPT

## 2020-02-06 NOTE — PROGRESS NOTES
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 Mono Cooper  Next MD visit: none PF isometric 10 sec x5 rest 10 sec in between, 30 sec rest between sets 4 sets  Instruct hygiene steps for sensor. TE:  PF facilitation hip add thomas 10 sec x 10  PF facilitation hip ER green x 20 seated. Instruct HEP and issue HO.      NMRED  See above fo BM: History of constipation since a child. Patient had improvements with starting Benefiber. Constipation with [place] Stool Scale 1 with diet change on cruise ship. Patient recently returned from trip and has restored  normal diet.  Stool yesterday was [place] Constanza Roman presents to physical therapy evaluation with primary c/o urge UI, frequency, urgency, CYNDI and FI. The results of the objective tests and measures show poor bladder habits and pelvic floor weakness.   Functional deficits include but are not limited to Patient education was provided on objective findings of external and internal evaluation and expectations with treatment outcomes.  Educated on instructed in bladder, bowel, and diet diary log and issued handout     Patient was instructed in and issued a HE Certification From: 1/34/7403  To:4/27/2020

## 2020-02-11 ENCOUNTER — OFFICE VISIT (OUTPATIENT)
Dept: PHYSICAL THERAPY | Age: 80
End: 2020-02-11
Attending: OBSTETRICS & GYNECOLOGY
Payer: MEDICARE

## 2020-02-11 DIAGNOSIS — N81.11 CYSTOCELE, MIDLINE: ICD-10-CM

## 2020-02-11 DIAGNOSIS — N39.41 URGE INCONTINENCE: ICD-10-CM

## 2020-02-11 DIAGNOSIS — R33.9 INCOMPLETE BLADDER EMPTYING: ICD-10-CM

## 2020-02-11 DIAGNOSIS — N32.81 DETRUSOR INSTABILITY: ICD-10-CM

## 2020-02-11 PROCEDURE — 97112 NEUROMUSCULAR REEDUCATION: CPT

## 2020-02-11 NOTE — PROGRESS NOTES
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 Surinder Nice MD visit: none TX#: 4/ Date: 2/11/2020                   TX#: 5/ Date: Tx#: 6/   NMRED:  PF isometric 10 sec x5 rest 10 sec in between, 30 sec rest between sets 4 sets  Instruct hygiene steps for sensor.   TE:  PF facilitation hip add thomas 10 sec x 10  PF facilitation h Benjamin Frausto is a 70year old female  who presents to therapy today with complaints of urgency, frequency, urge UI and some CYNDI. She noticed some improvements since starting Tropsium. Her voiding intervals improved and less urgency events.  She still fee Episodes of Leakage: Can be unknown, wetness found on panty liner  Amount of leakage: Small. Rare large amount.    Pad use: panty liner  Pad Change frequency: Couple times a day  Patient instructed in bladder diary  Do you ever leak urine without knowing it Pelvic Floor Muscle strength: (PERF= Power/Endurance/Reps/Fast) MMT: 2+/5/10 /Not performed   External Anal Sphincter: not tested at this time  Accessory Muscle Use: gluteals, adductors, abdominals    Tissue Laxity Test:  Anterior Wall: WNL  Posterior Wall Thank you for your referral. Please co-sign or sign and return this letter via fax as soon as possible to [phone].  If you have any questions, please contact me at Dept: [phone]    Sincerely,  Electronically signed by therapist: Simon Vo PT

## 2020-02-13 ENCOUNTER — APPOINTMENT (OUTPATIENT)
Dept: PHYSICAL THERAPY | Age: 80
End: 2020-02-13
Attending: OBSTETRICS & GYNECOLOGY
Payer: MEDICARE

## 2020-02-18 ENCOUNTER — OFFICE VISIT (OUTPATIENT)
Dept: PHYSICAL THERAPY | Age: 80
End: 2020-02-18
Attending: OBSTETRICS & GYNECOLOGY
Payer: MEDICARE

## 2020-02-18 DIAGNOSIS — R33.9 INCOMPLETE BLADDER EMPTYING: ICD-10-CM

## 2020-02-18 DIAGNOSIS — N32.81 DETRUSOR INSTABILITY: ICD-10-CM

## 2020-02-18 DIAGNOSIS — N81.11 CYSTOCELE, MIDLINE: ICD-10-CM

## 2020-02-18 DIAGNOSIS — N39.41 URGE INCONTINENCE: ICD-10-CM

## 2020-02-18 PROCEDURE — 97110 THERAPEUTIC EXERCISES: CPT

## 2020-02-18 NOTE — PROGRESS NOTES
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 Gabrielle Ewing  Next MD visit: none PF isometric 10 sec x5 rest 10 sec in between, 30 sec rest between sets 4 sets  Instruct hygiene steps for sensor. TE:  PF facilitation hip add thomas 10 sec x 10  PF facilitation hip ER green x 20 seated. Instruct HEP and issue HO.      NMRED  See above fo Lesli Uribe is a 70year old female  who presents to therapy today with complaints of urgency, frequency, urge UI and some CYNDI. She noticed some improvements since starting Tropsium. Her voiding intervals improved and less urgency events.  She still fee Episodes of Leakage: Can be unknown, wetness found on panty liner  Amount of leakage: Small. Rare large amount.    Pad use: panty liner  Pad Change frequency: Couple times a day  Patient instructed in bladder diary  Do you ever leak urine without knowing it Pelvic Floor Muscle strength: (PERF= Power/Endurance/Reps/Fast) MMT: 2+/5/10 /Not performed   External Anal Sphincter: not tested at this time  Accessory Muscle Use: gluteals, adductors, abdominals    Tissue Laxity Test:  Anterior Wall: WNL  Posterior Wall Thank you for your referral. Please co-sign or sign and return this letter via fax as soon as possible to [phone].  If you have any questions, please contact me at Dept: [phone]    Sincerely,  Electronically signed by therapist: Kathie Wallace PT

## 2020-02-20 ENCOUNTER — APPOINTMENT (OUTPATIENT)
Dept: PHYSICAL THERAPY | Age: 80
End: 2020-02-20
Attending: OBSTETRICS & GYNECOLOGY
Payer: MEDICARE

## 2020-02-27 ENCOUNTER — OFFICE VISIT (OUTPATIENT)
Dept: PHYSICAL THERAPY | Age: 80
End: 2020-02-27
Attending: OBSTETRICS & GYNECOLOGY
Payer: MEDICARE

## 2020-02-27 PROCEDURE — 97110 THERAPEUTIC EXERCISES: CPT

## 2020-02-27 NOTE — PROGRESS NOTES
Discharge Summary    Dear Dr. Lulu Puente,    Pt has attended 7, cancelled 0, and no shown 0 visits in Physical Therapy. Subjective: Patient reports she is very happy with her progress.  She has increase voiding time to 1.5 -3  Hours depending on how killian Thank you for your referral. If you have any questions, please contact me at Dept: [phone].     Sincerely,  Electronically signed by therapist: Sina Arenas PT    [de-identified] certification required: Yes  Please co-sign or sign and return this letter v PF pre-activation side stepping yellow band added 2 steps at a time x 6 laps   TE:  Establish and perform final HEP  PF pre-activation forward lunge arms overhead x 10 alternating  Forward lunge diagonal x 10  Squat with arm raises x 10  Side stepping to c Pt describes pain level:  No Complaints of pain    Pregnant Now: No  Obstetrical/Gynecological history: 2 vaginal deliveries with episiotomies. Hysterectomy in 1997 due to uterine prolapse.    Urodynamic Test: Yes, see chart  Occupation/Activities: retired [employer] presents to physical therapy evaluation with primary c/o urge UI, frequency, urgency, CYNDI and FI. The results of the objective tests and measures show poor bladder habits and pelvic floor weakness.   Functional deficits include but are not limited to Patient education was provided on objective findings of external and internal evaluation and expectations with treatment outcomes.  Educated on instructed in bladder, bowel, and diet diary log and issued handout     Patient was instructed in and issued a HE Certification From: 5/81/2340  To:4/27/2020

## 2020-03-26 ENCOUNTER — TELEPHONE (OUTPATIENT)
Dept: INTERNAL MEDICINE CLINIC | Facility: CLINIC | Age: 80
End: 2020-03-26

## 2020-03-26 DIAGNOSIS — I10 ESSENTIAL HYPERTENSION: Primary | ICD-10-CM

## 2020-03-26 DIAGNOSIS — E78.6 LOW HDL (UNDER 40): ICD-10-CM

## 2020-03-26 DIAGNOSIS — N39.41 URGE INCONTINENCE: ICD-10-CM

## 2020-04-28 ENCOUNTER — VIRTUAL PHONE E/M (OUTPATIENT)
Dept: UROLOGY | Facility: HOSPITAL | Age: 80
End: 2020-04-28
Attending: OBSTETRICS & GYNECOLOGY
Payer: MEDICARE

## 2020-04-28 VITALS — HEIGHT: 64 IN | BODY MASS INDEX: 34.49 KG/M2 | WEIGHT: 202 LBS

## 2020-04-28 DIAGNOSIS — R33.9 INCOMPLETE BLADDER EMPTYING: ICD-10-CM

## 2020-04-28 DIAGNOSIS — N32.81 DETRUSOR INSTABILITY OF BLADDER: ICD-10-CM

## 2020-04-28 DIAGNOSIS — N81.11 CYSTOCELE, MIDLINE: ICD-10-CM

## 2020-04-28 DIAGNOSIS — N39.3 FEMALE STRESS INCONTINENCE: ICD-10-CM

## 2020-04-28 DIAGNOSIS — R35.1 NOCTURIA: ICD-10-CM

## 2020-04-28 DIAGNOSIS — N81.84 PELVIC MUSCLE WASTING: ICD-10-CM

## 2020-04-28 DIAGNOSIS — N39.41 URGE INCONTINENCE: Primary | ICD-10-CM

## 2020-04-28 NOTE — PROGRESS NOTES
Patient to follow up UUI, incomplete voiding, nocturia  Given circumstances surrounding COVID-19 this visit is being conducted as a televisit with pt's consent.     She is currently using pelvic floor PT, trospium XR 60mg q am  Not using [employer]

## 2020-04-28 NOTE — PATIENT INSTRUCTIONS
[facility]   [facility]    BOWEL REGIMEN    Constipation can have detrimental effects on bladder function and can worsen the symptoms of prolapse. It is important to avoid constipation.     The first step for treating co

## 2020-05-02 DIAGNOSIS — I10 ESSENTIAL HYPERTENSION: ICD-10-CM

## 2020-05-04 ENCOUNTER — TELEPHONE (OUTPATIENT)
Dept: INTERNAL MEDICINE CLINIC | Facility: CLINIC | Age: 80
End: 2020-05-04

## 2020-05-04 RX ORDER — LOSARTAN POTASSIUM 100 MG/1
TABLET ORAL
Qty: 90 TABLET | Refills: 3 | Status: SHIPPED | OUTPATIENT
Start: 2020-05-04 | End: 2021-05-17

## 2020-05-04 NOTE — TELEPHONE ENCOUNTER
[facility]: [phone]    Refill Req:  Losartan Pot 100 MG Tab    Fax in triage    Thank you

## 2020-06-09 ENCOUNTER — LAB ENCOUNTER (OUTPATIENT)
Dept: LAB | Age: 80
End: 2020-06-09
Attending: INTERNAL MEDICINE
Payer: MEDICARE

## 2020-06-09 DIAGNOSIS — N39.41 URGE INCONTINENCE: ICD-10-CM

## 2020-06-09 DIAGNOSIS — I10 ESSENTIAL HYPERTENSION: ICD-10-CM

## 2020-06-09 DIAGNOSIS — E78.6 LOW HDL (UNDER 40): ICD-10-CM

## 2020-06-09 PROCEDURE — 81003 URINALYSIS AUTO W/O SCOPE: CPT

## 2020-06-09 PROCEDURE — 36415 COLL VENOUS BLD VENIPUNCTURE: CPT

## 2020-06-09 PROCEDURE — 80053 COMPREHEN METABOLIC PANEL: CPT

## 2020-06-09 PROCEDURE — 85025 COMPLETE CBC W/AUTO DIFF WBC: CPT

## 2020-06-09 PROCEDURE — 80061 LIPID PANEL: CPT

## 2020-06-09 PROCEDURE — 84443 ASSAY THYROID STIM HORMONE: CPT

## 2020-06-15 ENCOUNTER — OFFICE VISIT (OUTPATIENT)
Dept: INTERNAL MEDICINE CLINIC | Facility: CLINIC | Age: 80
End: 2020-06-15
Payer: MEDICARE

## 2020-06-15 VITALS
BODY MASS INDEX: 33.73 KG/M2 | TEMPERATURE: 98 F | HEART RATE: 90 BPM | SYSTOLIC BLOOD PRESSURE: 136 MMHG | OXYGEN SATURATION: 98 % | RESPIRATION RATE: 16 BRPM | HEIGHT: 64.5 IN | DIASTOLIC BLOOD PRESSURE: 78 MMHG | WEIGHT: 200 LBS

## 2020-06-15 DIAGNOSIS — E78.6 LOW HDL (UNDER 40): ICD-10-CM

## 2020-06-15 DIAGNOSIS — Z00.00 ANNUAL PHYSICAL EXAM: Primary | ICD-10-CM

## 2020-06-15 DIAGNOSIS — G25.81 RLS (RESTLESS LEGS SYNDROME): ICD-10-CM

## 2020-06-15 DIAGNOSIS — I10 ESSENTIAL HYPERTENSION: ICD-10-CM

## 2020-06-15 DIAGNOSIS — Z12.11 COLON CANCER SCREENING: ICD-10-CM

## 2020-06-15 DIAGNOSIS — S86.012A RUPTURE OF LEFT ACHILLES TENDON, INITIAL ENCOUNTER: ICD-10-CM

## 2020-06-15 DIAGNOSIS — N39.41 URGE INCONTINENCE: ICD-10-CM

## 2020-06-15 DIAGNOSIS — M25.561 RECURRENT PAIN OF RIGHT KNEE: ICD-10-CM

## 2020-06-15 DIAGNOSIS — M51.36 DDD (DEGENERATIVE DISC DISEASE), LUMBAR: ICD-10-CM

## 2020-06-15 DIAGNOSIS — M48.061 LUMBAR FORAMINAL STENOSIS: ICD-10-CM

## 2020-06-15 DIAGNOSIS — Z00.00 ENCOUNTER FOR ANNUAL HEALTH EXAMINATION: ICD-10-CM

## 2020-06-15 PROCEDURE — G0439 PPPS, SUBSEQ VISIT: HCPCS | Performed by: INTERNAL MEDICINE

## 2020-06-15 NOTE — PROGRESS NOTES
HPI:   Flynn Davalos is a 70year old female who presents for a Medicare Subsequent Annual Wellness visit (Pt already had Initial Annual Wellness).     HPI:  Here for AWV  Normal state of health  Denies cp, sob or edema    PAST MEDICAL, SOCIAL, FAMILY HIS [employer] Exp 8/19/19     Recurrent pain of right knee     DDD (degenerative disc disease), lumbar     Lumbar foraminal stenosis    Wt Readings from Last 3 Encounters:  06/15/20 : 200 lb (90.7 kg)  04/28/20 : 202 lb (91.6 kg)  01/27/20 : 202 lb (91.6 brother; MI in her father; Valvular heart disease, htn in her brother; crest syndrome in her mother. SOCIAL HISTORY:   She  reports that she has never smoked. She has never used smokeless tobacco. She reports current alcohol use.  She reports that she borrego bruit or JVD   Back:   Symmetric, no curvature, ROM normal, no CVA tenderness   Lungs:   Clear to auscultation bilaterally, respirations unlabored   Heart:  Regular rate and rhythm, S1 and S2 normal, no murmur, rub, or gallop   Abdomen:   Soft, non-tender, incontinence- defer to urology     Essential hypertension- controlled. Cont current med therapy     RLS (restless legs syndrome)- stable.  monitor     Sx 5/21/19, Left Achilles Repair, Exc Haglunds, Gastroc Soleus Lengthening V-Y w/ Dr. Yossi Kelley Maintenance if applicable    Flex Sigmoidoscopy Screen every 10 years No results found for this or any previous visit. No flowsheet data found. Fecal Occult Blood Annually No results found for: FOB No flowsheet data found.     Glaucoma Screening      Op history found This may be covered with your pharmacy  prescription benefits      SPECIFIC DISEASE MONITORING Internal Lab or Procedure External Lab or Procedure      Annual Monitoring of Persistent     Medications (ACE/ARB, digoxin diuretics, anticonvulsan disease), lumbar  Encounter for annual health examination  Colon cancer screening    No orders of the defined types were placed in this encounter.       Meds This Visit:  Requested Prescriptions      No prescriptions requested or ordered in this encounter

## 2020-06-15 NOTE — PATIENT INSTRUCTIONS
Dori Arenas SCREENING SCHEDULE   Tests on this list are recommended by your physician but may not be covered, or covered at this frequency, by your insurer. Please check with your insurance carrier before scheduling to verify coverage.    PREVENTATI criteria:   • Men who are 73-68 years old and have smoked more than 100 cigarettes in their lifetime   • Anyone with a family history    Colorectal Cancer Screening  Covered up to Age 76     Colonoscopy Screen   Covered every 10 years- more often if abnorm Influenza  Covered Annually Orders placed or performed in visit on 10/09/19   • FLU VACC HIGH DOSE PRSV FREE   Orders placed or performed in visit on 10/13/17   • INFLUENZA VIRUS VACCINE, QUAD, PRESERVATIVE FREE, 0.5 ML   Orders placed or performed in visi Medical Society website. http://www. idph.state. [place]/public/books/advin.htm  A link to the [employer]. This site has a lot of good information including definitions of the different types of Advance Directives.  It also has the [place]

## 2020-08-18 ENCOUNTER — HOSPITAL ENCOUNTER (OUTPATIENT)
Dept: MAMMOGRAPHY | Age: 80
Discharge: HOME | End: 2020-08-18
Attending: INTERNAL MEDICINE
Payer: MEDICARE

## 2020-08-18 ENCOUNTER — HOSPITAL ENCOUNTER (OUTPATIENT)
Dept: BONE DENSITY | Age: 80
Discharge: HOME | End: 2020-08-18
Attending: INTERNAL MEDICINE
Payer: MEDICARE

## 2020-08-18 DIAGNOSIS — Z12.31 ENCOUNTER FOR SCREENING MAMMOGRAM FOR MALIGNANT NEOPLASM OF BREAST: ICD-10-CM

## 2020-08-18 DIAGNOSIS — Z78.0 ASYMPTOMATIC POSTMENOPAUSAL STATE: ICD-10-CM

## 2020-08-18 PROCEDURE — 77067 SCR MAMMO BI INCL CAD: CPT | Performed by: INTERNAL MEDICINE

## 2020-08-18 PROCEDURE — 77080 DXA BONE DENSITY AXIAL: CPT | Performed by: INTERNAL MEDICINE

## 2020-08-18 PROCEDURE — 77063 BREAST TOMOSYNTHESIS BI: CPT | Performed by: INTERNAL MEDICINE

## 2020-09-14 ENCOUNTER — NURSE ONLY (OUTPATIENT)
Dept: INTERNAL MEDICINE CLINIC | Facility: CLINIC | Age: 80
End: 2020-09-14
Payer: MEDICARE

## 2020-09-14 DIAGNOSIS — Z23 NEED FOR VACCINATION: Primary | ICD-10-CM

## 2020-09-14 PROCEDURE — 90662 IIV NO PRSV INCREASED AG IM: CPT | Performed by: INTERNAL MEDICINE

## 2020-09-14 PROCEDURE — G0008 ADMIN INFLUENZA VIRUS VAC: HCPCS | Performed by: INTERNAL MEDICINE

## 2020-09-19 ENCOUNTER — APPOINTMENT (OUTPATIENT)
Dept: LAB | Age: 80
End: 2020-09-19
Attending: INTERNAL MEDICINE
Payer: MEDICARE

## 2020-09-19 DIAGNOSIS — Z01.818 PRE-OP TESTING: ICD-10-CM

## 2020-09-20 LAB — SARS-COV-2 RNA RESP QL NAA+PROBE: NOT DETECTED

## 2020-09-22 PROBLEM — Z86.010 HISTORY OF ADENOMATOUS POLYP OF COLON: Status: ACTIVE | Noted: 2020-09-22

## 2020-09-22 PROBLEM — Z86.0101 HISTORY OF ADENOMATOUS POLYP OF COLON: Status: ACTIVE | Noted: 2020-09-22

## 2020-09-22 PROBLEM — D12.2 BENIGN NEOPLASM OF ASCENDING COLON: Status: ACTIVE | Noted: 2020-09-22

## 2020-09-22 PROBLEM — R13.10 DYSPHAGIA, UNSPECIFIED: Status: ACTIVE | Noted: 2020-09-22

## 2020-09-22 PROBLEM — R10.13 ABDOMINAL PAIN, EPIGASTRIC: Status: ACTIVE | Noted: 2020-09-22

## 2020-11-03 ENCOUNTER — OFFICE VISIT (OUTPATIENT)
Dept: UROLOGY | Facility: HOSPITAL | Age: 80
End: 2020-11-03
Attending: OBSTETRICS & GYNECOLOGY
Payer: MEDICARE

## 2020-11-03 VITALS — WEIGHT: 185 LBS | HEIGHT: 64.5 IN | RESPIRATION RATE: 18 BRPM | TEMPERATURE: 98 F | BODY MASS INDEX: 31.2 KG/M2

## 2020-11-03 DIAGNOSIS — N32.81 DETRUSOR INSTABILITY OF BLADDER: ICD-10-CM

## 2020-11-03 DIAGNOSIS — N39.41 URGE INCONTINENCE: Primary | ICD-10-CM

## 2020-11-03 DIAGNOSIS — R33.9 INCOMPLETE BLADDER EMPTYING: ICD-10-CM

## 2020-11-03 DIAGNOSIS — N81.11 CYSTOCELE, MIDLINE: ICD-10-CM

## 2020-11-03 DIAGNOSIS — N95.2 POSTMENOPAUSAL ATROPHIC VAGINITIS: ICD-10-CM

## 2020-11-03 PROCEDURE — 99212 OFFICE O/P EST SF 10 MIN: CPT

## 2020-11-03 NOTE — PROGRESS NOTES
Patient presents to follow up UUI, incomplete voiding, nocturia    She is currently using trospium XR 60mg daily, pelvic exercises    She reports +improvement and reports dry mouth,but has some constipation - reviewed bowel mgmt  Denies other significant s

## 2020-12-15 ENCOUNTER — OFFICE VISIT (OUTPATIENT)
Dept: INTERNAL MEDICINE CLINIC | Facility: CLINIC | Age: 80
End: 2020-12-15
Payer: MEDICARE

## 2020-12-15 VITALS
SYSTOLIC BLOOD PRESSURE: 130 MMHG | WEIGHT: 185 LBS | TEMPERATURE: 97 F | HEART RATE: 78 BPM | HEIGHT: 64.5 IN | RESPIRATION RATE: 16 BRPM | DIASTOLIC BLOOD PRESSURE: 80 MMHG | OXYGEN SATURATION: 99 % | BODY MASS INDEX: 31.2 KG/M2

## 2020-12-15 DIAGNOSIS — I10 ESSENTIAL HYPERTENSION: Primary | ICD-10-CM

## 2020-12-15 PROBLEM — R10.13 ABDOMINAL PAIN, EPIGASTRIC: Status: RESOLVED | Noted: 2020-09-22 | Resolved: 2020-12-15

## 2020-12-15 PROBLEM — D12.2 BENIGN NEOPLASM OF ASCENDING COLON: Status: RESOLVED | Noted: 2020-09-22 | Resolved: 2020-12-15

## 2020-12-15 PROCEDURE — 99213 OFFICE O/P EST LOW 20 MIN: CPT | Performed by: INTERNAL MEDICINE

## 2020-12-15 NOTE — PROGRESS NOTES
HPI:    Patient ID: Tatiana Harris is a 68year old female. Hypertension      Tatiana Harris is a 68year old female. HPI:   Patient presents for recheck of her hypertension.  Pt has been taking medications as instructed, no medication side effects Oral Tab Take 81 mg by mouth daily.         Past Medical History:   Diagnosis Date   • Abdominal pain    • Actinic keratosis    • Arthritis    • Back pain    • Constipation    • Displacement of lumbar intervertebral disc without myelopathy     L3-L4   • Esdras unusual skin lesions or rashes  RESPIRATORY: denies shortness of breath with exertion  CARDIOVASCULAR: denies chest pain on exertion  GI: denies abdominal pain and denies heartburn  NEURO: denies headaches    EXAM:   /80   Pulse 78   Temp 97.3 °F (36 (primary encounter diagnosis)    No orders of the defined types were placed in this encounter.       Meds This Visit:  Requested Prescriptions      No prescriptions requested or ordered in this encounter       Imaging & Referrals:  None       IC#[id number]

## 2021-01-26 ENCOUNTER — TELEPHONE (OUTPATIENT)
Dept: UROLOGY | Facility: HOSPITAL | Age: 81
End: 2021-01-26

## 2021-01-26 RX ORDER — TROSPIUM CHLORIDE ER 60 MG/1
60 CAPSULE ORAL DAILY
Qty: 90 CAPSULE | Refills: 3 | Status: SHIPPED | OUTPATIENT
Start: 2021-01-26 | End: 2021-11-12

## 2021-01-26 NOTE — TELEPHONE ENCOUNTER
Pt called after receiving refill request for her trospium ER 60mg. Appears it is not a covered medication. Pt aware. Will need to do a PA later today. Pt notified of above. Pt verbalized an understanding and agrees w/ plan. All questions answered.

## 2021-02-11 NOTE — TELEPHONE ENCOUNTER
Called [facility] Patient's member ID [id number] spoke to Pastor reference #[id number] regarding PA to be faxed over.  For Trospium ER 60 mg  Filing out PA and faxed back

## 2021-03-09 DIAGNOSIS — Z23 NEED FOR VACCINATION: ICD-10-CM

## 2021-05-04 ENCOUNTER — VIRTUAL PHONE E/M (OUTPATIENT)
Dept: UROLOGY | Facility: HOSPITAL | Age: 81
End: 2021-05-04
Attending: OBSTETRICS & GYNECOLOGY
Payer: MEDICARE

## 2021-05-04 DIAGNOSIS — N39.41 URGE INCONTINENCE: ICD-10-CM

## 2021-05-04 DIAGNOSIS — N95.2 POSTMENOPAUSAL ATROPHIC VAGINITIS: ICD-10-CM

## 2021-05-04 DIAGNOSIS — R33.9 INCOMPLETE BLADDER EMPTYING: ICD-10-CM

## 2021-05-04 DIAGNOSIS — R35.1 NOCTURIA: ICD-10-CM

## 2021-05-04 DIAGNOSIS — K59.00 CONSTIPATION, UNSPECIFIED CONSTIPATION TYPE: ICD-10-CM

## 2021-05-04 DIAGNOSIS — N32.81 DETRUSOR INSTABILITY OF BLADDER: Primary | ICD-10-CM

## 2021-05-04 NOTE — PROGRESS NOTES
Patient to follow up DO  Given circumstances surrounding COVID-19 this visit is being conducted as a televisit with pt's consent.     She is currently using trospium XR 60mg daily    She reports +improvement and reports some dry mouth, not worsening constip

## 2021-05-15 DIAGNOSIS — I10 ESSENTIAL HYPERTENSION: ICD-10-CM

## 2021-05-17 RX ORDER — LOSARTAN POTASSIUM 100 MG/1
TABLET ORAL
Qty: 90 TABLET | Refills: 3 | Status: SHIPPED | OUTPATIENT
Start: 2021-05-17 | End: 2022-05-23

## 2021-06-04 ENCOUNTER — TELEPHONE (OUTPATIENT)
Dept: INTERNAL MEDICINE CLINIC | Facility: CLINIC | Age: 81
End: 2021-06-04

## 2021-06-04 DIAGNOSIS — N39.41 URGE INCONTINENCE: ICD-10-CM

## 2021-06-04 DIAGNOSIS — I10 ESSENTIAL HYPERTENSION: Primary | ICD-10-CM

## 2021-06-04 NOTE — TELEPHONE ENCOUNTER
Patient is scheduled for an AWV on 06/17/2021 and is requesting labs to be entered with [facility] includes the [facility].  Patient aware to fast.

## 2021-06-11 ENCOUNTER — LAB ENCOUNTER (OUTPATIENT)
Dept: LAB | Age: 81
End: 2021-06-11
Attending: INTERNAL MEDICINE
Payer: MEDICARE

## 2021-06-11 DIAGNOSIS — N39.41 URGE INCONTINENCE: ICD-10-CM

## 2021-06-11 DIAGNOSIS — I10 ESSENTIAL HYPERTENSION: ICD-10-CM

## 2021-06-11 PROCEDURE — 80061 LIPID PANEL: CPT

## 2021-06-11 PROCEDURE — 80053 COMPREHEN METABOLIC PANEL: CPT

## 2021-06-11 PROCEDURE — 36415 COLL VENOUS BLD VENIPUNCTURE: CPT

## 2021-06-11 PROCEDURE — 81003 URINALYSIS AUTO W/O SCOPE: CPT

## 2021-06-11 PROCEDURE — 85025 COMPLETE CBC W/AUTO DIFF WBC: CPT

## 2021-06-11 PROCEDURE — 84443 ASSAY THYROID STIM HORMONE: CPT

## 2021-06-17 ENCOUNTER — OFFICE VISIT (OUTPATIENT)
Dept: INTERNAL MEDICINE CLINIC | Facility: CLINIC | Age: 81
End: 2021-06-17
Payer: MEDICARE

## 2021-06-17 VITALS
DIASTOLIC BLOOD PRESSURE: 82 MMHG | WEIGHT: 192 LBS | BODY MASS INDEX: 32.78 KG/M2 | HEART RATE: 74 BPM | TEMPERATURE: 98 F | SYSTOLIC BLOOD PRESSURE: 126 MMHG | HEIGHT: 64 IN | OXYGEN SATURATION: 98 % | RESPIRATION RATE: 16 BRPM

## 2021-06-17 DIAGNOSIS — G25.81 RLS (RESTLESS LEGS SYNDROME): ICD-10-CM

## 2021-06-17 DIAGNOSIS — S86.012A RUPTURE OF LEFT ACHILLES TENDON, INITIAL ENCOUNTER: ICD-10-CM

## 2021-06-17 DIAGNOSIS — M48.061 LUMBAR FORAMINAL STENOSIS: ICD-10-CM

## 2021-06-17 DIAGNOSIS — Z00.00 ENCOUNTER FOR ANNUAL HEALTH EXAMINATION: ICD-10-CM

## 2021-06-17 DIAGNOSIS — Z00.00 ANNUAL PHYSICAL EXAM: Primary | ICD-10-CM

## 2021-06-17 DIAGNOSIS — I10 ESSENTIAL HYPERTENSION: ICD-10-CM

## 2021-06-17 DIAGNOSIS — M25.561 RECURRENT PAIN OF RIGHT KNEE: ICD-10-CM

## 2021-06-17 DIAGNOSIS — N39.41 URGE INCONTINENCE: ICD-10-CM

## 2021-06-17 DIAGNOSIS — E78.6 LOW HDL (UNDER 40): ICD-10-CM

## 2021-06-17 DIAGNOSIS — Z12.31 ENCOUNTER FOR SCREENING MAMMOGRAM FOR MALIGNANT NEOPLASM OF BREAST: ICD-10-CM

## 2021-06-17 PROBLEM — M51.369 DDD (DEGENERATIVE DISC DISEASE), LUMBAR: Status: RESOLVED | Noted: 2019-10-09 | Resolved: 2021-06-17

## 2021-06-17 PROBLEM — M51.36 DDD (DEGENERATIVE DISC DISEASE), LUMBAR: Status: RESOLVED | Noted: 2019-10-09 | Resolved: 2021-06-17

## 2021-06-17 PROBLEM — R13.10 DYSPHAGIA, UNSPECIFIED: Status: RESOLVED | Noted: 2020-09-22 | Resolved: 2021-06-17

## 2021-06-17 PROCEDURE — G0439 PPPS, SUBSEQ VISIT: HCPCS | Performed by: INTERNAL MEDICINE

## 2021-06-17 NOTE — PATIENT INSTRUCTIONS
Carlos Alberto Martinez SCREENING SCHEDULE   Tests on this list are recommended by your physician but may not be covered, or covered at this frequency, by your insurer. Please check with your insurance carrier before scheduling to verify coverage.    Linsey Peoples 08/18/2020      No recommendations at this time   Pap and Pelvic    Pap   Covered every 2 years for women at normal risk;  Annually if at high risk -  No recommendations at this time    Chlamydia Annually if high risk -  No recommendations at this time   Sc http://www. idph.state. [place]/public/books/advin.htm  A link to the [employer]. This site has a lot of good information including definitions of the different types of Advance Directives.  It also has the State forms available on it's webs

## 2021-06-17 NOTE — PROGRESS NOTES
HPI:   Davian Sauceda is a 68year old female who presents for a Medicare Subsequent Annual Wellness visit (Pt already had Initial Annual Wellness).     HPI:  Here for AWV  No new complaints or events    PAST MEDICAL, SOCIAL, FAMILY HISTORIES REVIEWED WITH Recurrent pain of right knee     Lumbar foraminal stenosis     History of adenomatous polyp of colon    Wt Readings from Last 3 Encounters:  06/17/21 : 192 lb (87.1 kg)  12/15/20 : 185 lb (83.9 kg)  11/03/20 : 185 lb (83.9 kg)     Last Cholesterol Labs: Problems with swallowing, Stool incontinence, Unspecified hemorrhoids with other complication, and Vitamin D deficiency.     She  has a past surgical history that includes colonoscopy (10/28/2005); other surgical history (03/24/2010); appendectomy (02/04/20 Alert, cooperative, no distress, appears stated age   Head:  Normocephalic, without obvious abnormality, atraumatic   Eyes:  PERRL, conjunctiva/corneas clear, EOM's intact both eyes   Ears:  Normal TM's and external ear canals, both ears   Nose: Nares norm SUMMARY:   Diagnoses and all orders for this visit:    Annual physical exam    Urge incontinence    Sx 5/21/19, Left Achilles Repair, Exc Haglunds, Gastroc Soleus Lengthening V-Y w/ Dr. Kwaku Lynch Exp 8/19/19    RLS (restless legs syndrome)    Recurre /  Glucose    One screening every 12 months if never tested or if previously tested but not diagnosed with pre-diabetes   One screening every 6 months if diagnosed with pre-diabetes Lab Results   Component Value Date     (H) 06/11/2021        Cardio mammogram covered for patients aged 32-38 -    No recommendations at this time    Immunizations    Influenza Covered once per flu season  Please get every year 09/14/2020  No recommendations at this time    Pneumococcal Each vaccine ([id number] & Pneumovax2

## 2021-08-23 ENCOUNTER — HOSPITAL ENCOUNTER (OUTPATIENT)
Dept: MAMMOGRAPHY | Age: 81
Discharge: HOME | End: 2021-08-23
Attending: INTERNAL MEDICINE
Payer: MEDICARE

## 2021-08-23 DIAGNOSIS — Z12.31 ENCOUNTER FOR SCREENING MAMMOGRAM FOR MALIGNANT NEOPLASM OF BREAST: ICD-10-CM

## 2021-08-23 PROCEDURE — 77063 BREAST TOMOSYNTHESIS BI: CPT | Performed by: INTERNAL MEDICINE

## 2021-08-23 PROCEDURE — 77067 SCR MAMMO BI INCL CAD: CPT | Performed by: INTERNAL MEDICINE

## 2021-08-25 ENCOUNTER — HOSPITAL ENCOUNTER (OUTPATIENT)
Dept: MAMMOGRAPHY | Facility: HOSPITAL | Age: 81
Discharge: HOME | End: 2021-08-25
Attending: INTERNAL MEDICINE
Payer: MEDICARE

## 2021-08-25 DIAGNOSIS — R92.2 INCONCLUSIVE MAMMOGRAM: ICD-10-CM

## 2021-08-25 PROCEDURE — 77066 DX MAMMO INCL CAD BI: CPT | Performed by: INTERNAL MEDICINE

## 2021-08-25 PROCEDURE — 77062 BREAST TOMOSYNTHESIS BI: CPT | Performed by: INTERNAL MEDICINE

## 2021-11-03 NOTE — TELEPHONE ENCOUNTER
Rx routed for approval. The pt is not due to be seen until 4/2018. Other (Free Text): no suspicious lesions noted on skin examination Render Risk Assessment In Note?: no Note Text (......Xxx Chief Complaint.): This diagnosis correlates with the Detail Level: Simple

## 2021-11-12 RX ORDER — TROSPIUM CHLORIDE ER 60 MG/1
CAPSULE ORAL
Qty: 90 CAPSULE | Refills: 3 | Status: SHIPPED | OUTPATIENT
Start: 2021-11-12

## 2021-12-16 ENCOUNTER — OFFICE VISIT (OUTPATIENT)
Dept: INTERNAL MEDICINE CLINIC | Facility: CLINIC | Age: 81
End: 2021-12-16
Payer: MEDICARE

## 2021-12-16 VITALS
DIASTOLIC BLOOD PRESSURE: 72 MMHG | TEMPERATURE: 98 F | HEIGHT: 64 IN | SYSTOLIC BLOOD PRESSURE: 134 MMHG | HEART RATE: 68 BPM | BODY MASS INDEX: 33.46 KG/M2 | WEIGHT: 196 LBS | RESPIRATION RATE: 16 BRPM | OXYGEN SATURATION: 99 %

## 2021-12-16 DIAGNOSIS — I10 ESSENTIAL HYPERTENSION: Primary | ICD-10-CM

## 2021-12-16 DIAGNOSIS — H90.0 CONDUCTIVE HEARING LOSS, BILATERAL: ICD-10-CM

## 2021-12-16 DIAGNOSIS — H61.23 BILATERAL IMPACTED CERUMEN: ICD-10-CM

## 2021-12-16 PROCEDURE — 99213 OFFICE O/P EST LOW 20 MIN: CPT | Performed by: INTERNAL MEDICINE

## 2021-12-16 NOTE — PROGRESS NOTES
Subjective:   Patient ID: Jasmina Felton is a 80year old female.   The 10-year ASCVD risk score (Jarrett Vaughan, et al., 2013) is: 33.1%    Values used to calculate the score:      Age: 80 years      Sex: Female      Is Non- : No Vitamin D3, Cholecalciferol, 1000 UNITS Oral Cap Take 1,000 Units by mouth daily.      • LOSARTAN 100 MG Oral Tab TAKE 1 TABLET BY MOUTH ONE TIME A DAY  90 tablet 3   • PREVIDENT 5000 BOOSTER PLUS 1.1 % Dental Paste USE TWICE DAILY AS INSTRUCTED     • hiwot SYSTEMS:   GENERAL HEALTH: feels well otherwise  SKIN: denies any unusual skin lesions or rashes  RESPIRATORY: denies shortness of breath with exertion  CARDIOVASCULAR: denies chest pain on exertion  GI: denies abdominal pain and denies heartburn  NEURO: d (MULTIVITAMINS OR) Take  by mouth.        Allergies:  Zocor [Simvastatin *    MYALGIA    Comment:Tabs  Zosyn                   RASH    Comment:SOLN    Objective:   Physical Exam    Assessment & Plan:   Essential hypertension  (primary encounter diagnosis)

## 2022-01-07 ENCOUNTER — HOSPITAL ENCOUNTER (OUTPATIENT)
Dept: CT IMAGING | Age: 82
Discharge: HOME | End: 2022-01-07
Attending: INTERNAL MEDICINE

## 2022-01-07 DIAGNOSIS — I10 ESSENTIAL HYPERTENSION: ICD-10-CM

## 2022-01-13 ENCOUNTER — TELEPHONE (OUTPATIENT)
Dept: INTERNAL MEDICINE CLINIC | Facility: CLINIC | Age: 82
End: 2022-01-13

## 2022-01-13 DIAGNOSIS — Z91.89 10 YEAR RISK OF MI OR STROKE 7.5% OR GREATER: Primary | ICD-10-CM

## 2022-01-13 RX ORDER — ROSUVASTATIN CALCIUM 10 MG/1
10 TABLET, COATED ORAL NIGHTLY
Qty: 30 TABLET | Refills: 2 | Status: SHIPPED | OUTPATIENT
Start: 2022-01-13

## 2022-01-13 NOTE — TELEPHONE ENCOUNTER
Patient notified of results and recommendations. Verbalized understanding. Medication sent to preferred pharmacy.

## 2022-01-13 NOTE — TELEPHONE ENCOUNTER
Harsha Louis MD  [facility] Clinical Staff  CT calcium score consistent with Definite, at least mild atherosclerotic plaque  Mild risk for CAD.    To lower cad risk recommend crestor 10 mg daily

## 2022-04-04 ENCOUNTER — TELEPHONE (OUTPATIENT)
Dept: INTERNAL MEDICINE CLINIC | Facility: CLINIC | Age: 82
End: 2022-04-04

## 2022-04-04 RX ORDER — ROSUVASTATIN CALCIUM 10 MG/1
TABLET, COATED ORAL
Qty: 90 TABLET | Refills: 2 | Status: SHIPPED | OUTPATIENT
Start: 2022-04-04

## 2022-04-04 NOTE — TELEPHONE ENCOUNTER
[address] [place] [address]  Ph: [phone]  Fx: [phone]    rosuvastatin (CRESTOR) 10 MG Oral Tab  Qty: 30    2 refills

## 2022-05-09 ENCOUNTER — TELEPHONE (OUTPATIENT)
Dept: INTERNAL MEDICINE CLINIC | Facility: CLINIC | Age: 82
End: 2022-05-09

## 2022-05-09 NOTE — TELEPHONE ENCOUNTER
Requesting annual lab orders    [facility])

## 2022-05-10 NOTE — TELEPHONE ENCOUNTER
Annual lab orders placed. LVM notifying patient and left Central Scheduling phone number if patient would like to schedule lab draw.

## 2022-05-23 DIAGNOSIS — I10 ESSENTIAL HYPERTENSION: ICD-10-CM

## 2022-05-23 RX ORDER — LOSARTAN POTASSIUM 100 MG/1
100 TABLET ORAL DAILY
Qty: 90 TABLET | Refills: 3 | Status: SHIPPED | OUTPATIENT
Start: 2022-05-23

## 2022-05-23 NOTE — TELEPHONE ENCOUNTER
Fax request:  LOSARTAN 100 MG Oral Tab 90 tablet     To be sent to:   [address] [place] [address] [address], [facility] [address] [phone], [phone]

## 2022-06-15 ENCOUNTER — LABORATORY ENCOUNTER (OUTPATIENT)
Dept: LAB | Age: 82
End: 2022-06-15
Attending: INTERNAL MEDICINE
Payer: MEDICARE

## 2022-06-15 DIAGNOSIS — I10 ESSENTIAL HYPERTENSION: ICD-10-CM

## 2022-06-15 DIAGNOSIS — N39.41 URGE INCONTINENCE: ICD-10-CM

## 2022-06-15 LAB
ALBUMIN SERPL-MCNC: 3.8 G/DL (ref 3.4–5)
ALBUMIN/GLOB SERPL: 1.1 {RATIO} (ref 1–2)
ALP LIVER SERPL-CCNC: 80 U/L
ALT SERPL-CCNC: 30 U/L
ANION GAP SERPL CALC-SCNC: 6 MMOL/L (ref 0–18)
AST SERPL-CCNC: 23 U/L (ref 15–37)
BASOPHILS # BLD AUTO: 0.06 X10(3) UL (ref 0–0.2)
BASOPHILS NFR BLD AUTO: 0.7 %
BILIRUB SERPL-MCNC: 0.6 MG/DL (ref 0.1–2)
BILIRUB UR QL STRIP.AUTO: NEGATIVE
BUN BLD-MCNC: 13 MG/DL (ref 7–18)
CALCIUM BLD-MCNC: 9.5 MG/DL (ref 8.5–10.1)
CHLORIDE SERPL-SCNC: 108 MMOL/L (ref 98–112)
CHOLEST SERPL-MCNC: 115 MG/DL (ref ?–200)
CLARITY UR REFRACT.AUTO: CLEAR
CO2 SERPL-SCNC: 26 MMOL/L (ref 21–32)
COLOR UR AUTO: YELLOW
CREAT BLD-MCNC: 1 MG/DL
EOSINOPHIL # BLD AUTO: 0.18 X10(3) UL (ref 0–0.7)
EOSINOPHIL NFR BLD AUTO: 2.1 %
ERYTHROCYTE [DISTWIDTH] IN BLOOD BY AUTOMATED COUNT: 14 %
FASTING PATIENT LIPID ANSWER: YES
FASTING STATUS PATIENT QL REPORTED: YES
GLOBULIN PLAS-MCNC: 3.5 G/DL (ref 2.8–4.4)
GLUCOSE BLD-MCNC: 100 MG/DL (ref 70–99)
GLUCOSE UR STRIP.AUTO-MCNC: NEGATIVE MG/DL
HCT VFR BLD AUTO: 43.6 %
HDLC SERPL-MCNC: 48 MG/DL (ref 40–59)
HGB BLD-MCNC: 14 G/DL
IMM GRANULOCYTES # BLD AUTO: 0.02 X10(3) UL (ref 0–1)
IMM GRANULOCYTES NFR BLD: 0.2 %
KETONES UR STRIP.AUTO-MCNC: NEGATIVE MG/DL
LDLC SERPL CALC-MCNC: 51 MG/DL (ref ?–100)
LEUKOCYTE ESTERASE UR QL STRIP.AUTO: NEGATIVE
LYMPHOCYTES # BLD AUTO: 2.65 X10(3) UL (ref 1–4)
LYMPHOCYTES NFR BLD AUTO: 31.5 %
MCH RBC QN AUTO: 28.3 PG (ref 26–34)
MCHC RBC AUTO-ENTMCNC: 32.1 G/DL (ref 31–37)
MCV RBC AUTO: 88.1 FL
MONOCYTES # BLD AUTO: 0.62 X10(3) UL (ref 0.1–1)
MONOCYTES NFR BLD AUTO: 7.4 %
NEUTROPHILS # BLD AUTO: 4.89 X10 (3) UL (ref 1.5–7.7)
NEUTROPHILS # BLD AUTO: 4.89 X10(3) UL (ref 1.5–7.7)
NEUTROPHILS NFR BLD AUTO: 58.1 %
NITRITE UR QL STRIP.AUTO: NEGATIVE
NONHDLC SERPL-MCNC: 67 MG/DL (ref ?–130)
OSMOLALITY SERPL CALC.SUM OF ELEC: 290 MOSM/KG (ref 275–295)
PH UR STRIP.AUTO: 6 [PH] (ref 5–8)
PLATELET # BLD AUTO: 276 10(3)UL (ref 150–450)
POTASSIUM SERPL-SCNC: 3.9 MMOL/L (ref 3.5–5.1)
PROT SERPL-MCNC: 7.3 G/DL (ref 6.4–8.2)
PROT UR STRIP.AUTO-MCNC: NEGATIVE MG/DL
RBC # BLD AUTO: 4.95 X10(6)UL
RBC UR QL AUTO: NEGATIVE
SODIUM SERPL-SCNC: 140 MMOL/L (ref 136–145)
SP GR UR STRIP.AUTO: 1.01 (ref 1–1.03)
TRIGL SERPL-MCNC: 82 MG/DL (ref 30–149)
TSI SER-ACNC: 1.88 MIU/ML (ref 0.36–3.74)
UROBILINOGEN UR STRIP.AUTO-MCNC: <2 MG/DL
VLDLC SERPL CALC-MCNC: 12 MG/DL (ref 0–30)
WBC # BLD AUTO: 8.4 X10(3) UL (ref 4–11)

## 2022-06-15 PROCEDURE — 81003 URINALYSIS AUTO W/O SCOPE: CPT

## 2022-06-15 PROCEDURE — 36415 COLL VENOUS BLD VENIPUNCTURE: CPT

## 2022-06-15 PROCEDURE — 84443 ASSAY THYROID STIM HORMONE: CPT

## 2022-06-15 PROCEDURE — 80053 COMPREHEN METABOLIC PANEL: CPT

## 2022-06-15 PROCEDURE — 80061 LIPID PANEL: CPT

## 2022-06-15 PROCEDURE — 85025 COMPLETE CBC W/AUTO DIFF WBC: CPT

## 2022-06-23 ENCOUNTER — OFFICE VISIT (OUTPATIENT)
Dept: INTERNAL MEDICINE CLINIC | Facility: CLINIC | Age: 82
End: 2022-06-23
Payer: MEDICARE

## 2022-06-23 VITALS
SYSTOLIC BLOOD PRESSURE: 130 MMHG | BODY MASS INDEX: 33.12 KG/M2 | DIASTOLIC BLOOD PRESSURE: 74 MMHG | RESPIRATION RATE: 16 BRPM | HEIGHT: 64 IN | WEIGHT: 194 LBS | TEMPERATURE: 98 F | OXYGEN SATURATION: 98 % | HEART RATE: 71 BPM

## 2022-06-23 DIAGNOSIS — N39.41 URGE INCONTINENCE: ICD-10-CM

## 2022-06-23 DIAGNOSIS — I10 ESSENTIAL HYPERTENSION: ICD-10-CM

## 2022-06-23 DIAGNOSIS — Z00.00 ANNUAL PHYSICAL EXAM: Primary | ICD-10-CM

## 2022-06-23 DIAGNOSIS — N18.31 STAGE 3A CHRONIC KIDNEY DISEASE (HCC): ICD-10-CM

## 2022-06-23 DIAGNOSIS — Z12.31 ENCOUNTER FOR SCREENING MAMMOGRAM FOR MALIGNANT NEOPLASM OF BREAST: ICD-10-CM

## 2022-06-23 DIAGNOSIS — Z00.00 ENCOUNTER FOR ANNUAL HEALTH EXAMINATION: ICD-10-CM

## 2022-06-23 DIAGNOSIS — G25.81 RLS (RESTLESS LEGS SYNDROME): ICD-10-CM

## 2022-06-23 PROBLEM — M48.061 LUMBAR FORAMINAL STENOSIS: Status: RESOLVED | Noted: 2019-10-22 | Resolved: 2022-06-23

## 2022-06-23 PROBLEM — M25.561 RECURRENT PAIN OF RIGHT KNEE: Status: RESOLVED | Noted: 2019-09-12 | Resolved: 2022-06-23

## 2022-06-23 PROCEDURE — G0439 PPPS, SUBSEQ VISIT: HCPCS | Performed by: INTERNAL MEDICINE

## 2022-06-23 PROCEDURE — 1125F AMNT PAIN NOTED PAIN PRSNT: CPT | Performed by: INTERNAL MEDICINE

## 2022-08-24 ENCOUNTER — HOSPITAL ENCOUNTER (OUTPATIENT)
Dept: MAMMOGRAPHY | Age: 82
Discharge: HOME | End: 2022-08-24
Attending: INTERNAL MEDICINE
Payer: MEDICARE

## 2022-08-24 DIAGNOSIS — Z12.31 ENCOUNTER FOR SCREENING MAMMOGRAM FOR MALIGNANT NEOPLASM OF BREAST: ICD-10-CM

## 2022-08-24 PROCEDURE — 77067 SCR MAMMO BI INCL CAD: CPT | Performed by: INTERNAL MEDICINE

## 2022-08-24 PROCEDURE — 77063 BREAST TOMOSYNTHESIS BI: CPT | Performed by: INTERNAL MEDICINE

## 2022-12-19 ENCOUNTER — OFFICE VISIT (OUTPATIENT)
Dept: INTERNAL MEDICINE CLINIC | Facility: CLINIC | Age: 82
End: 2022-12-19
Payer: MEDICARE

## 2022-12-19 VITALS
WEIGHT: 198 LBS | HEART RATE: 72 BPM | HEIGHT: 64 IN | RESPIRATION RATE: 16 BRPM | SYSTOLIC BLOOD PRESSURE: 134 MMHG | DIASTOLIC BLOOD PRESSURE: 70 MMHG | TEMPERATURE: 98 F | OXYGEN SATURATION: 100 % | BODY MASS INDEX: 33.8 KG/M2

## 2022-12-19 DIAGNOSIS — I10 ESSENTIAL HYPERTENSION: Primary | ICD-10-CM

## 2022-12-19 PROBLEM — S83.232A COMPLEX TEAR OF MEDIAL MENISCUS OF LEFT KNEE: Status: ACTIVE | Noted: 2022-12-19

## 2022-12-19 PROCEDURE — 99213 OFFICE O/P EST LOW 20 MIN: CPT | Performed by: INTERNAL MEDICINE

## 2022-12-19 PROCEDURE — 1126F AMNT PAIN NOTED NONE PRSNT: CPT | Performed by: INTERNAL MEDICINE

## 2022-12-26 DIAGNOSIS — Z91.89 10 YEAR RISK OF MI OR STROKE 7.5% OR GREATER: ICD-10-CM

## 2022-12-26 RX ORDER — ROSUVASTATIN CALCIUM 10 MG/1
TABLET, COATED ORAL
Qty: 90 TABLET | Refills: 0 | Status: SHIPPED | OUTPATIENT
Start: 2022-12-26

## 2023-01-26 ENCOUNTER — OFFICE VISIT (OUTPATIENT)
Dept: INTERNAL MEDICINE CLINIC | Facility: CLINIC | Age: 83
End: 2023-01-26
Payer: MEDICARE

## 2023-01-26 VITALS
DIASTOLIC BLOOD PRESSURE: 82 MMHG | OXYGEN SATURATION: 98 % | TEMPERATURE: 98 F | RESPIRATION RATE: 16 BRPM | SYSTOLIC BLOOD PRESSURE: 128 MMHG | HEIGHT: 64 IN | BODY MASS INDEX: 33.46 KG/M2 | WEIGHT: 196 LBS | HEART RATE: 73 BPM

## 2023-01-26 DIAGNOSIS — J98.01 POST-INFECTION BRONCHOSPASM: Primary | ICD-10-CM

## 2023-01-26 PROCEDURE — 99213 OFFICE O/P EST LOW 20 MIN: CPT | Performed by: INTERNAL MEDICINE

## 2023-01-26 PROCEDURE — 1126F AMNT PAIN NOTED NONE PRSNT: CPT | Performed by: INTERNAL MEDICINE

## 2023-01-26 RX ORDER — PREDNISONE 20 MG/1
TABLET ORAL
Qty: 14 TABLET | Refills: 0 | Status: SHIPPED | OUTPATIENT
Start: 2023-01-26

## 2023-01-26 RX ORDER — ALBUTEROL SULFATE 90 UG/1
2 AEROSOL, METERED RESPIRATORY (INHALATION) EVERY 4 HOURS PRN
Qty: 1 EACH | Refills: 0 | Status: SHIPPED | OUTPATIENT
Start: 2023-01-26

## 2023-01-31 ENCOUNTER — TELEPHONE (OUTPATIENT)
Dept: INTERNAL MEDICINE CLINIC | Facility: CLINIC | Age: 83
End: 2023-01-31

## 2023-01-31 DIAGNOSIS — J98.01 POST-INFECTION BRONCHOSPASM: Primary | ICD-10-CM

## 2023-01-31 RX ORDER — BENZONATATE 200 MG/1
200 CAPSULE ORAL 3 TIMES DAILY PRN
Qty: 21 CAPSULE | Refills: 0 | Status: SHIPPED | OUTPATIENT
Start: 2023-01-31

## 2023-01-31 RX ORDER — PREDNISONE 20 MG/1
40 TABLET ORAL DAILY
Qty: 10 TABLET | Refills: 0 | Status: SHIPPED | OUTPATIENT
Start: 2023-01-31 | End: 2023-02-05

## 2023-01-31 NOTE — TELEPHONE ENCOUNTER
Patient using Albuterol inhaler scheduled q4h and will complete Pred burst tomorrow, 02/01. Continues to have intermittent dry hacking cough with wheezing. Denies fevers or SOB. Per Sam Stoddard PA-C: extend Pred burst x 5 days and add on Tessalon perles as needed, continue scheduled inhaler use. Prescriptions sent to pharmacy and patient verbalized understanding regarding plan of care.

## 2023-01-31 NOTE — TELEPHONE ENCOUNTER
Pt was instructed to let us know if her cough is better and it is not all the way gone yet. She has improved but is still wheezing. She leaves for a cruise on Sunday and wants to know if there is something else she can take before she leaves. Please advise.

## 2023-03-14 ENCOUNTER — HOSPITAL ENCOUNTER (OUTPATIENT)
Age: 83
Discharge: HOME | End: 2023-03-14
Payer: MEDICARE

## 2023-03-14 ENCOUNTER — HOSPITAL ENCOUNTER (OUTPATIENT)
Age: 83
Discharge: LEFT BEFORE DISCHARGE | End: 2023-03-14
Payer: MEDICARE

## 2023-03-14 ENCOUNTER — APPOINTMENT (OUTPATIENT)
Dept: GENERAL RADIOLOGY | Age: 83
End: 2023-03-14
Attending: PHYSICIAN ASSISTANT
Payer: MEDICARE

## 2023-03-14 VITALS
OXYGEN SATURATION: 98 % | DIASTOLIC BLOOD PRESSURE: 80 MMHG | TEMPERATURE: 98 F | SYSTOLIC BLOOD PRESSURE: 140 MMHG | RESPIRATION RATE: 16 BRPM | HEART RATE: 83 BPM

## 2023-03-14 DIAGNOSIS — S62.633A CLOSED DISPLACED FRACTURE OF DISTAL PHALANX OF LEFT MIDDLE FINGER, INITIAL ENCOUNTER: Primary | ICD-10-CM

## 2023-03-14 DIAGNOSIS — M79.89 FINGER SWELLING: ICD-10-CM

## 2023-03-14 PROCEDURE — 73140 X-RAY EXAM OF FINGER(S): CPT | Performed by: PHYSICIAN ASSISTANT

## 2023-03-14 PROCEDURE — A4570 SPLINT: HCPCS | Performed by: PHYSICIAN ASSISTANT

## 2023-03-14 PROCEDURE — 99203 OFFICE O/P NEW LOW 30 MIN: CPT | Performed by: PHYSICIAN ASSISTANT

## 2023-03-14 NOTE — DISCHARGE INSTRUCTIONS
Call the provided orthopedic hand specialist to arrange follow-up. The finger splint is much as possible.

## 2023-03-14 NOTE — ED INITIAL ASSESSMENT (HPI)
Pt states she injured her left 3rd finger yesterday getting some thing heavy for a high shelf. Instant pain to finger. Over night the finger became swollen and red.

## 2023-03-15 ENCOUNTER — TELEPHONE (OUTPATIENT)
Dept: ORTHOPEDICS CLINIC | Facility: CLINIC | Age: 83
End: 2023-03-15

## 2023-03-15 NOTE — TELEPHONE ENCOUNTER
Patient is scheduled. Will new xrays be needed?      Future Appointments   Date Time Provider Chele Mercedes   3/20/2023  9:20 AM Anselmo Thomason MD [facility] [id number]   6/19/2023  9:15 AM Juli Gomez MD [facility] [address]

## 2023-03-15 NOTE — TELEPHONE ENCOUNTER
There is a fracture along the dorsal base of the 3rd distal phalanx with proximal distraction of the proximal fracture fragment by approximately 2 mm.  Please advise when Dr. Brett Mcdermott can see this patient

## 2023-03-20 ENCOUNTER — OFFICE VISIT (OUTPATIENT)
Dept: ORTHOPEDICS CLINIC | Facility: CLINIC | Age: 83
End: 2023-03-20
Payer: MEDICARE

## 2023-03-20 ENCOUNTER — OFFICE VISIT (OUTPATIENT)
Dept: OCCUPATIONAL MEDICINE | Age: 83
End: 2023-03-20
Attending: ORTHOPAEDIC SURGERY
Payer: MEDICARE

## 2023-03-20 ENCOUNTER — HOSPITAL ENCOUNTER (OUTPATIENT)
Dept: GENERAL RADIOLOGY | Age: 83
Discharge: HOME | End: 2023-03-20
Attending: ORTHOPAEDIC SURGERY
Payer: MEDICARE

## 2023-03-20 VITALS — WEIGHT: 196 LBS | BODY MASS INDEX: 33.46 KG/M2 | HEIGHT: 64 IN

## 2023-03-20 DIAGNOSIS — M79.645 FINGER PAIN, LEFT: Primary | ICD-10-CM

## 2023-03-20 DIAGNOSIS — S62.633A CLOSED DISPLACED FRACTURE OF DISTAL PHALANX OF LEFT MIDDLE FINGER, INITIAL ENCOUNTER: ICD-10-CM

## 2023-03-20 DIAGNOSIS — M79.645 FINGER PAIN, LEFT: ICD-10-CM

## 2023-03-20 PROCEDURE — 99204 OFFICE O/P NEW MOD 45 MIN: CPT | Performed by: ORTHOPAEDIC SURGERY

## 2023-03-20 PROCEDURE — 97760 ORTHOTIC MGMT&TRAING 1ST ENC: CPT

## 2023-03-20 PROCEDURE — 1125F AMNT PAIN NOTED PAIN PRSNT: CPT | Performed by: ORTHOPAEDIC SURGERY

## 2023-03-20 PROCEDURE — 73140 X-RAY EXAM OF FINGER(S): CPT | Performed by: ORTHOPAEDIC SURGERY

## 2023-04-06 DIAGNOSIS — Z91.89 10 YEAR RISK OF MI OR STROKE 7.5% OR GREATER: ICD-10-CM

## 2023-04-06 RX ORDER — ROSUVASTATIN CALCIUM 10 MG/1
TABLET, COATED ORAL
Qty: 90 TABLET | Refills: 0 | Status: SHIPPED | OUTPATIENT
Start: 2023-04-06

## 2023-05-04 ENCOUNTER — HOSPITAL ENCOUNTER (OUTPATIENT)
Dept: GENERAL RADIOLOGY | Age: 83
Discharge: HOME | End: 2023-05-04
Attending: ORTHOPAEDIC SURGERY
Payer: MEDICARE

## 2023-05-04 ENCOUNTER — OFFICE VISIT (OUTPATIENT)
Dept: ORTHOPEDICS CLINIC | Facility: CLINIC | Age: 83
End: 2023-05-04
Payer: MEDICARE

## 2023-05-04 VITALS — HEIGHT: 64 IN | WEIGHT: 196 LBS | BODY MASS INDEX: 33.46 KG/M2

## 2023-05-04 DIAGNOSIS — S62.633A CLOSED DISPLACED FRACTURE OF DISTAL PHALANX OF LEFT MIDDLE FINGER, INITIAL ENCOUNTER: Primary | ICD-10-CM

## 2023-05-04 DIAGNOSIS — S62.633A CLOSED DISPLACED FRACTURE OF DISTAL PHALANX OF LEFT MIDDLE FINGER, INITIAL ENCOUNTER: ICD-10-CM

## 2023-05-04 PROCEDURE — 1125F AMNT PAIN NOTED PAIN PRSNT: CPT | Performed by: ORTHOPAEDIC SURGERY

## 2023-05-04 PROCEDURE — 99213 OFFICE O/P EST LOW 20 MIN: CPT | Performed by: ORTHOPAEDIC SURGERY

## 2023-05-04 PROCEDURE — 73140 X-RAY EXAM OF FINGER(S): CPT | Performed by: ORTHOPAEDIC SURGERY

## 2023-05-25 DIAGNOSIS — I10 ESSENTIAL HYPERTENSION: ICD-10-CM

## 2023-05-25 RX ORDER — LOSARTAN POTASSIUM 100 MG/1
TABLET ORAL
Qty: 90 TABLET | Refills: 0 | Status: SHIPPED | OUTPATIENT
Start: 2023-05-25

## 2023-05-31 ENCOUNTER — TELEPHONE (OUTPATIENT)
Dept: INTERNAL MEDICINE CLINIC | Facility: CLINIC | Age: 83
End: 2023-05-31

## 2023-05-31 DIAGNOSIS — I10 ESSENTIAL HYPERTENSION: Primary | ICD-10-CM

## 2023-05-31 NOTE — TELEPHONE ENCOUNTER
Preferred Lab: Ashia Muck: 01/26/2023  Next OV & Reason:  6/19/23 AWV  Patient was notified to fast 8-10 hours drinking only water/black coffee prior to having labs drawn and may need to submit urine sample.   Lab orders will be placed by end of day:  yes  Patient requesting return call:  no

## 2023-06-01 ENCOUNTER — OFFICE VISIT (OUTPATIENT)
Dept: ORTHOPEDICS CLINIC | Facility: CLINIC | Age: 83
End: 2023-06-01
Payer: MEDICARE

## 2023-06-01 VITALS — HEIGHT: 64 IN | WEIGHT: 196 LBS | BODY MASS INDEX: 33.46 KG/M2

## 2023-06-01 DIAGNOSIS — S62.633A CLOSED DISPLACED FRACTURE OF DISTAL PHALANX OF LEFT MIDDLE FINGER, INITIAL ENCOUNTER: Primary | ICD-10-CM

## 2023-06-01 PROCEDURE — 99212 OFFICE O/P EST SF 10 MIN: CPT | Performed by: ORTHOPAEDIC SURGERY

## 2023-06-01 PROCEDURE — 1125F AMNT PAIN NOTED PAIN PRSNT: CPT | Performed by: ORTHOPAEDIC SURGERY

## 2023-06-14 ENCOUNTER — LAB ENCOUNTER (OUTPATIENT)
Dept: LAB | Age: 83
End: 2023-06-14
Attending: INTERNAL MEDICINE
Payer: MEDICARE

## 2023-06-14 DIAGNOSIS — I10 ESSENTIAL HYPERTENSION: ICD-10-CM

## 2023-06-14 LAB
ALBUMIN SERPL-MCNC: 3.5 G/DL (ref 3.4–5)
ALBUMIN/GLOB SERPL: 1 {RATIO} (ref 1–2)
ALP LIVER SERPL-CCNC: 81 U/L
ALT SERPL-CCNC: 25 U/L
ANION GAP SERPL CALC-SCNC: 6 MMOL/L (ref 0–18)
AST SERPL-CCNC: 22 U/L (ref 15–37)
BASOPHILS # BLD AUTO: 0.05 X10(3) UL (ref 0–0.2)
BASOPHILS NFR BLD AUTO: 0.7 %
BILIRUB SERPL-MCNC: 0.6 MG/DL (ref 0.1–2)
BILIRUB UR QL STRIP.AUTO: NEGATIVE
BUN BLD-MCNC: 13 MG/DL (ref 7–18)
CALCIUM BLD-MCNC: 9.3 MG/DL (ref 8.5–10.1)
CHLORIDE SERPL-SCNC: 106 MMOL/L (ref 98–112)
CHOLEST SERPL-MCNC: 117 MG/DL (ref ?–200)
CLARITY UR REFRACT.AUTO: CLEAR
CO2 SERPL-SCNC: 26 MMOL/L (ref 21–32)
CREAT BLD-MCNC: 0.86 MG/DL
EOSINOPHIL # BLD AUTO: 0.15 X10(3) UL (ref 0–0.7)
EOSINOPHIL NFR BLD AUTO: 2 %
ERYTHROCYTE [DISTWIDTH] IN BLOOD BY AUTOMATED COUNT: 13.8 %
FASTING PATIENT LIPID ANSWER: YES
FASTING STATUS PATIENT QL REPORTED: YES
GFR SERPLBLD BASED ON 1.73 SQ M-ARVRAT: 68 ML/MIN/1.73M2 (ref 60–?)
GLOBULIN PLAS-MCNC: 3.5 G/DL (ref 2.8–4.4)
GLUCOSE BLD-MCNC: 97 MG/DL (ref 70–99)
GLUCOSE UR STRIP.AUTO-MCNC: NEGATIVE MG/DL
HCT VFR BLD AUTO: 42.8 %
HDLC SERPL-MCNC: 50 MG/DL (ref 40–59)
HGB BLD-MCNC: 13.3 G/DL
IMM GRANULOCYTES # BLD AUTO: 0.03 X10(3) UL (ref 0–1)
IMM GRANULOCYTES NFR BLD: 0.4 %
KETONES UR STRIP.AUTO-MCNC: NEGATIVE MG/DL
LDLC SERPL CALC-MCNC: 50 MG/DL (ref ?–100)
LEUKOCYTE ESTERASE UR QL STRIP.AUTO: NEGATIVE
LYMPHOCYTES # BLD AUTO: 2.82 X10(3) UL (ref 1–4)
LYMPHOCYTES NFR BLD AUTO: 37.3 %
MCH RBC QN AUTO: 27.7 PG (ref 26–34)
MCHC RBC AUTO-ENTMCNC: 31.1 G/DL (ref 31–37)
MCV RBC AUTO: 89 FL
MONOCYTES # BLD AUTO: 0.53 X10(3) UL (ref 0.1–1)
MONOCYTES NFR BLD AUTO: 7 %
NEUTROPHILS # BLD AUTO: 3.98 X10 (3) UL (ref 1.5–7.7)
NEUTROPHILS # BLD AUTO: 3.98 X10(3) UL (ref 1.5–7.7)
NEUTROPHILS NFR BLD AUTO: 52.6 %
NITRITE UR QL STRIP.AUTO: NEGATIVE
NONHDLC SERPL-MCNC: 67 MG/DL (ref ?–130)
OSMOLALITY SERPL CALC.SUM OF ELEC: 286 MOSM/KG (ref 275–295)
PH UR STRIP.AUTO: 7 [PH] (ref 5–8)
PLATELET # BLD AUTO: 301 10(3)UL (ref 150–450)
POTASSIUM SERPL-SCNC: 4 MMOL/L (ref 3.5–5.1)
PROT SERPL-MCNC: 7 G/DL (ref 6.4–8.2)
PROT UR STRIP.AUTO-MCNC: NEGATIVE MG/DL
RBC # BLD AUTO: 4.81 X10(6)UL
RBC UR QL AUTO: NEGATIVE
SODIUM SERPL-SCNC: 138 MMOL/L (ref 136–145)
SP GR UR STRIP.AUTO: 1.01 (ref 1–1.03)
TRIGL SERPL-MCNC: 86 MG/DL (ref 30–149)
TSI SER-ACNC: 2.14 MIU/ML (ref 0.36–3.74)
UROBILINOGEN UR STRIP.AUTO-MCNC: <2 MG/DL
VLDLC SERPL CALC-MCNC: 12 MG/DL (ref 0–30)
WBC # BLD AUTO: 7.6 X10(3) UL (ref 4–11)

## 2023-06-14 PROCEDURE — 84443 ASSAY THYROID STIM HORMONE: CPT

## 2023-06-14 PROCEDURE — 80061 LIPID PANEL: CPT

## 2023-06-14 PROCEDURE — 80053 COMPREHEN METABOLIC PANEL: CPT

## 2023-06-14 PROCEDURE — 85025 COMPLETE CBC W/AUTO DIFF WBC: CPT

## 2023-06-14 PROCEDURE — 81003 URINALYSIS AUTO W/O SCOPE: CPT

## 2023-06-14 PROCEDURE — 36415 COLL VENOUS BLD VENIPUNCTURE: CPT

## 2023-06-19 ENCOUNTER — OFFICE VISIT (OUTPATIENT)
Dept: INTERNAL MEDICINE CLINIC | Facility: CLINIC | Age: 83
End: 2023-06-19
Payer: MEDICARE

## 2023-06-19 VITALS
HEART RATE: 76 BPM | WEIGHT: 199 LBS | SYSTOLIC BLOOD PRESSURE: 134 MMHG | TEMPERATURE: 98 F | DIASTOLIC BLOOD PRESSURE: 78 MMHG | BODY MASS INDEX: 33.97 KG/M2 | OXYGEN SATURATION: 97 % | RESPIRATION RATE: 16 BRPM | HEIGHT: 64 IN

## 2023-06-19 DIAGNOSIS — I10 ESSENTIAL HYPERTENSION: ICD-10-CM

## 2023-06-19 DIAGNOSIS — G25.81 RLS (RESTLESS LEGS SYNDROME): ICD-10-CM

## 2023-06-19 DIAGNOSIS — Z00.00 ANNUAL PHYSICAL EXAM: Primary | ICD-10-CM

## 2023-06-19 DIAGNOSIS — Z78.0 POSTMENOPAUSAL: ICD-10-CM

## 2023-06-19 DIAGNOSIS — Z12.31 ENCOUNTER FOR SCREENING MAMMOGRAM FOR MALIGNANT NEOPLASM OF BREAST: ICD-10-CM

## 2023-06-19 DIAGNOSIS — N39.41 URGE INCONTINENCE: ICD-10-CM

## 2023-06-19 DIAGNOSIS — Z00.00 ENCOUNTER FOR ANNUAL HEALTH EXAMINATION: ICD-10-CM

## 2023-06-19 PROBLEM — S83.232A COMPLEX TEAR OF MEDIAL MENISCUS OF LEFT KNEE: Status: RESOLVED | Noted: 2022-12-19 | Resolved: 2023-06-19

## 2023-06-19 PROBLEM — N18.31 STAGE 3A CHRONIC KIDNEY DISEASE (HCC): Status: RESOLVED | Noted: 2022-06-23 | Resolved: 2023-06-19

## 2023-07-05 DIAGNOSIS — Z91.89 10 YEAR RISK OF MI OR STROKE 7.5% OR GREATER: ICD-10-CM

## 2023-07-05 RX ORDER — ROSUVASTATIN CALCIUM 10 MG/1
TABLET, COATED ORAL
Qty: 90 TABLET | Refills: 0 | Status: SHIPPED | OUTPATIENT
Start: 2023-07-05

## 2023-07-05 NOTE — TELEPHONE ENCOUNTER
Last time medication was refilled 04/06/23  Quantity and # of refills 90 w/ 0  Last OV 06/19/2023  Next OV 12/14/2023    Sent to Dr. Nancy Meadows for approval. Possible Drug Interaction.

## 2023-07-13 ENCOUNTER — OFFICE VISIT (OUTPATIENT)
Dept: ORTHOPEDICS CLINIC | Facility: CLINIC | Age: 83
End: 2023-07-13
Payer: MEDICARE

## 2023-07-13 VITALS — WEIGHT: 199 LBS | BODY MASS INDEX: 33.97 KG/M2 | HEIGHT: 64 IN

## 2023-07-13 DIAGNOSIS — S62.633A CLOSED DISPLACED FRACTURE OF DISTAL PHALANX OF LEFT MIDDLE FINGER, INITIAL ENCOUNTER: Primary | ICD-10-CM

## 2023-07-13 PROCEDURE — 99212 OFFICE O/P EST SF 10 MIN: CPT | Performed by: ORTHOPAEDIC SURGERY

## 2023-07-13 PROCEDURE — 1126F AMNT PAIN NOTED NONE PRSNT: CPT | Performed by: ORTHOPAEDIC SURGERY

## 2023-07-17 ENCOUNTER — HOSPITAL ENCOUNTER (OUTPATIENT)
Dept: CV DIAGNOSTICS | Age: 83
Discharge: HOME | End: 2023-07-17
Attending: INTERNAL MEDICINE
Payer: MEDICARE

## 2023-07-17 DIAGNOSIS — I10 ESSENTIAL HYPERTENSION: ICD-10-CM

## 2023-07-17 PROCEDURE — 93306 TTE W/DOPPLER COMPLETE: CPT | Performed by: INTERNAL MEDICINE

## 2023-08-23 DIAGNOSIS — I10 ESSENTIAL HYPERTENSION: ICD-10-CM

## 2023-08-23 RX ORDER — LOSARTAN POTASSIUM 100 MG/1
100 TABLET ORAL DAILY
Qty: 90 TABLET | Refills: 0 | Status: SHIPPED | OUTPATIENT
Start: 2023-08-23

## 2023-08-23 NOTE — TELEPHONE ENCOUNTER
Last time medication was refilled 05/25/2023  Quantity and # of refills 90 w/ 0  Last OV 06/19/2023  Next OV 12/14/2023    Medication Sent.

## 2023-08-30 ENCOUNTER — HOSPITAL ENCOUNTER (OUTPATIENT)
Dept: BONE DENSITY | Age: 83
Discharge: HOME | End: 2023-08-30
Attending: INTERNAL MEDICINE
Payer: MEDICARE

## 2023-08-30 ENCOUNTER — HOSPITAL ENCOUNTER (OUTPATIENT)
Dept: MAMMOGRAPHY | Age: 83
Discharge: HOME | End: 2023-08-30
Attending: INTERNAL MEDICINE
Payer: MEDICARE

## 2023-08-30 DIAGNOSIS — Z12.31 ENCOUNTER FOR SCREENING MAMMOGRAM FOR MALIGNANT NEOPLASM OF BREAST: ICD-10-CM

## 2023-08-30 DIAGNOSIS — Z78.0 POSTMENOPAUSAL: ICD-10-CM

## 2023-08-30 PROCEDURE — 77080 DXA BONE DENSITY AXIAL: CPT | Performed by: INTERNAL MEDICINE

## 2023-08-30 PROCEDURE — 77067 SCR MAMMO BI INCL CAD: CPT | Performed by: INTERNAL MEDICINE

## 2023-08-30 PROCEDURE — 77063 BREAST TOMOSYNTHESIS BI: CPT | Performed by: INTERNAL MEDICINE

## 2023-09-14 ENCOUNTER — HOSPITAL ENCOUNTER (OUTPATIENT)
Dept: MAMMOGRAPHY | Facility: HOSPITAL | Age: 83
Discharge: HOME | End: 2023-09-14
Attending: INTERNAL MEDICINE
Payer: MEDICARE

## 2023-09-14 DIAGNOSIS — R92.2 INCONCLUSIVE MAMMOGRAM: ICD-10-CM

## 2023-09-14 PROCEDURE — 77061 BREAST TOMOSYNTHESIS UNI: CPT | Performed by: INTERNAL MEDICINE

## 2023-09-14 PROCEDURE — 76642 ULTRASOUND BREAST LIMITED: CPT | Performed by: INTERNAL MEDICINE

## 2023-09-14 PROCEDURE — 77065 DX MAMMO INCL CAD UNI: CPT | Performed by: INTERNAL MEDICINE

## 2023-10-07 DIAGNOSIS — Z91.89 10 YEAR RISK OF MI OR STROKE 7.5% OR GREATER: ICD-10-CM

## 2023-10-09 RX ORDER — ROSUVASTATIN CALCIUM 10 MG/1
10 TABLET, COATED ORAL NIGHTLY
Qty: 90 TABLET | Refills: 1 | Status: SHIPPED | OUTPATIENT
Start: 2023-10-09

## 2023-10-09 NOTE — TELEPHONE ENCOUNTER
Patient comment: please prescribe 90 tablets with  refills     Last time medication was refilled 07/05/2023  Quantity and # of refills 90 w 0  Last OV 06/199/2023  Next OV 12/14/2023    Passed protocol    Allergy/contradiction please review     Sent to Dr. Maria C Bazzi for approval

## 2023-11-15 DIAGNOSIS — I10 ESSENTIAL HYPERTENSION: ICD-10-CM

## 2023-11-15 RX ORDER — LOSARTAN POTASSIUM 100 MG/1
100 TABLET ORAL DAILY
Qty: 90 TABLET | Refills: 0 | Status: SHIPPED | OUTPATIENT
Start: 2023-11-15

## 2023-12-14 ENCOUNTER — OFFICE VISIT (OUTPATIENT)
Dept: INTERNAL MEDICINE CLINIC | Facility: CLINIC | Age: 83
End: 2023-12-14
Payer: MEDICARE

## 2023-12-14 VITALS
TEMPERATURE: 97 F | HEART RATE: 80 BPM | WEIGHT: 200 LBS | DIASTOLIC BLOOD PRESSURE: 70 MMHG | SYSTOLIC BLOOD PRESSURE: 124 MMHG | RESPIRATION RATE: 16 BRPM | HEIGHT: 64 IN | OXYGEN SATURATION: 99 % | BODY MASS INDEX: 34.15 KG/M2

## 2023-12-14 DIAGNOSIS — I10 ESSENTIAL HYPERTENSION: Primary | ICD-10-CM

## 2023-12-14 PROCEDURE — 1125F AMNT PAIN NOTED PAIN PRSNT: CPT | Performed by: INTERNAL MEDICINE

## 2023-12-14 PROCEDURE — 99213 OFFICE O/P EST LOW 20 MIN: CPT | Performed by: INTERNAL MEDICINE

## 2024-02-06 DIAGNOSIS — I10 ESSENTIAL HYPERTENSION: ICD-10-CM

## 2024-02-07 RX ORDER — LOSARTAN POTASSIUM 100 MG/1
100 TABLET ORAL DAILY
Qty: 90 TABLET | Refills: 0 | Status: SHIPPED | OUTPATIENT
Start: 2024-02-07

## 2024-02-07 NOTE — TELEPHONE ENCOUNTER
Last time medication was refilled 11/15/2023  Quantity and # of refills 90 w 0  Last OV 12/14/2023  Next OV   Future Appointments   Date Time Provider Department Center   6/14/2024  8:30 AM Simón House MD [facility] [address]       Passed protocol, Rx sent.

## 2024-03-29 DIAGNOSIS — Z91.89 10 YEAR RISK OF MI OR STROKE 7.5% OR GREATER: ICD-10-CM

## 2024-03-29 RX ORDER — ROSUVASTATIN CALCIUM 10 MG/1
10 TABLET, COATED ORAL DAILY
Qty: 90 TABLET | Refills: 1 | Status: SHIPPED | OUTPATIENT
Start: 2024-03-29

## 2024-05-03 DIAGNOSIS — I10 ESSENTIAL HYPERTENSION: ICD-10-CM

## 2024-05-03 RX ORDER — LOSARTAN POTASSIUM 100 MG/1
100 TABLET ORAL DAILY
Qty: 90 TABLET | Refills: 1 | Status: SHIPPED | OUTPATIENT
Start: 2024-05-03

## 2024-05-03 NOTE — TELEPHONE ENCOUNTER
Last time medication was refilled 02/07/2024  Last OV 12/14/2023  Next OV due/scheduled   Future Appointments   Date Time Provider Department Center   6/14/2024  8:30 AM Simón House MD [facility] [address]     Passed protocol, Rx sent.

## 2024-06-03 ENCOUNTER — LAB ENCOUNTER (OUTPATIENT)
Dept: LAB | Age: 84
End: 2024-06-03
Attending: INTERNAL MEDICINE
Payer: MEDICARE

## 2024-06-03 DIAGNOSIS — I10 ESSENTIAL HYPERTENSION: ICD-10-CM

## 2024-06-03 LAB
ALBUMIN SERPL-MCNC: 3.6 G/DL (ref 3.4–5)
ALBUMIN/GLOB SERPL: 1 {RATIO} (ref 1–2)
ALP LIVER SERPL-CCNC: 82 U/L
ALT SERPL-CCNC: 22 U/L
ANION GAP SERPL CALC-SCNC: 8 MMOL/L (ref 0–18)
AST SERPL-CCNC: 19 U/L (ref 15–37)
BASOPHILS # BLD AUTO: 0.06 X10(3) UL (ref 0–0.2)
BASOPHILS NFR BLD AUTO: 0.7 %
BILIRUB SERPL-MCNC: 0.5 MG/DL (ref 0.1–2)
BILIRUB UR QL STRIP.AUTO: NEGATIVE
BUN BLD-MCNC: 12 MG/DL (ref 9–23)
CALCIUM BLD-MCNC: 8.9 MG/DL (ref 8.5–10.1)
CHLORIDE SERPL-SCNC: 108 MMOL/L (ref 98–112)
CHOLEST SERPL-MCNC: 120 MG/DL (ref ?–200)
CLARITY UR REFRACT.AUTO: CLEAR
CO2 SERPL-SCNC: 24 MMOL/L (ref 21–32)
COLOR UR AUTO: COLORLESS
CREAT BLD-MCNC: 1.04 MG/DL
EGFRCR SERPLBLD CKD-EPI 2021: 54 ML/MIN/1.73M2 (ref 60–?)
EOSINOPHIL # BLD AUTO: 0.16 X10(3) UL (ref 0–0.7)
EOSINOPHIL NFR BLD AUTO: 1.8 %
ERYTHROCYTE [DISTWIDTH] IN BLOOD BY AUTOMATED COUNT: 13.7 %
FASTING PATIENT LIPID ANSWER: YES
FASTING STATUS PATIENT QL REPORTED: YES
GLOBULIN PLAS-MCNC: 3.5 G/DL (ref 2.8–4.4)
GLUCOSE BLD-MCNC: 99 MG/DL (ref 70–99)
GLUCOSE UR STRIP.AUTO-MCNC: NORMAL MG/DL
HCT VFR BLD AUTO: 42.8 %
HDLC SERPL-MCNC: 53 MG/DL (ref 40–59)
HGB BLD-MCNC: 13.7 G/DL
IMM GRANULOCYTES # BLD AUTO: 0.02 X10(3) UL (ref 0–1)
IMM GRANULOCYTES NFR BLD: 0.2 %
KETONES UR STRIP.AUTO-MCNC: NEGATIVE MG/DL
LDLC SERPL CALC-MCNC: 49 MG/DL (ref ?–100)
LEUKOCYTE ESTERASE UR QL STRIP.AUTO: NEGATIVE
LYMPHOCYTES # BLD AUTO: 2.96 X10(3) UL (ref 1–4)
LYMPHOCYTES NFR BLD AUTO: 34 %
MCH RBC QN AUTO: 28.3 PG (ref 26–34)
MCHC RBC AUTO-ENTMCNC: 32 G/DL (ref 31–37)
MCV RBC AUTO: 88.4 FL
MONOCYTES # BLD AUTO: 0.58 X10(3) UL (ref 0.1–1)
MONOCYTES NFR BLD AUTO: 6.7 %
NEUTROPHILS # BLD AUTO: 4.92 X10 (3) UL (ref 1.5–7.7)
NEUTROPHILS # BLD AUTO: 4.92 X10(3) UL (ref 1.5–7.7)
NEUTROPHILS NFR BLD AUTO: 56.6 %
NITRITE UR QL STRIP.AUTO: NEGATIVE
NONHDLC SERPL-MCNC: 67 MG/DL (ref ?–130)
OSMOLALITY SERPL CALC.SUM OF ELEC: 290 MOSM/KG (ref 275–295)
PH UR STRIP.AUTO: 7 [PH] (ref 5–8)
PLATELET # BLD AUTO: 271 10(3)UL (ref 150–450)
POTASSIUM SERPL-SCNC: 3.9 MMOL/L (ref 3.5–5.1)
PROT SERPL-MCNC: 7.1 G/DL (ref 6.4–8.2)
PROT UR STRIP.AUTO-MCNC: NEGATIVE MG/DL
RBC # BLD AUTO: 4.84 X10(6)UL
RBC UR QL AUTO: NEGATIVE
SODIUM SERPL-SCNC: 140 MMOL/L (ref 136–145)
SP GR UR STRIP.AUTO: 1.01 (ref 1–1.03)
TRIGL SERPL-MCNC: 96 MG/DL (ref 30–149)
TSI SER-ACNC: 2.23 MIU/ML (ref 0.36–3.74)
UROBILINOGEN UR STRIP.AUTO-MCNC: NORMAL MG/DL
VLDLC SERPL CALC-MCNC: 14 MG/DL (ref 0–30)
WBC # BLD AUTO: 8.7 X10(3) UL (ref 4–11)

## 2024-06-03 PROCEDURE — 85025 COMPLETE CBC W/AUTO DIFF WBC: CPT

## 2024-06-03 PROCEDURE — 80053 COMPREHEN METABOLIC PANEL: CPT

## 2024-06-03 PROCEDURE — 80061 LIPID PANEL: CPT

## 2024-06-03 PROCEDURE — 81003 URINALYSIS AUTO W/O SCOPE: CPT

## 2024-06-03 PROCEDURE — 36415 COLL VENOUS BLD VENIPUNCTURE: CPT

## 2024-06-03 PROCEDURE — 84443 ASSAY THYROID STIM HORMONE: CPT

## 2024-06-14 ENCOUNTER — OFFICE VISIT (OUTPATIENT)
Dept: INTERNAL MEDICINE CLINIC | Facility: CLINIC | Age: 84
End: 2024-06-14
Payer: MEDICARE

## 2024-06-14 VITALS
HEIGHT: 64 IN | BODY MASS INDEX: 34.15 KG/M2 | DIASTOLIC BLOOD PRESSURE: 70 MMHG | RESPIRATION RATE: 14 BRPM | HEART RATE: 80 BPM | WEIGHT: 200 LBS | OXYGEN SATURATION: 98 % | SYSTOLIC BLOOD PRESSURE: 122 MMHG | TEMPERATURE: 98 F

## 2024-06-14 DIAGNOSIS — Z00.00 ANNUAL PHYSICAL EXAM: Primary | ICD-10-CM

## 2024-06-14 DIAGNOSIS — Z91.89 10 YEAR RISK OF MI OR STROKE 7.5% OR GREATER: ICD-10-CM

## 2024-06-14 DIAGNOSIS — G25.81 RLS (RESTLESS LEGS SYNDROME): ICD-10-CM

## 2024-06-14 DIAGNOSIS — I10 ESSENTIAL HYPERTENSION: ICD-10-CM

## 2024-06-14 DIAGNOSIS — N39.41 URGE INCONTINENCE: ICD-10-CM

## 2024-06-14 DIAGNOSIS — N18.31 CKD STAGE 3A, GFR 45-59 ML/MIN (HCC): ICD-10-CM

## 2024-06-14 DIAGNOSIS — Z00.00 ENCOUNTER FOR ANNUAL HEALTH EXAMINATION: ICD-10-CM

## 2024-06-14 PROCEDURE — G0439 PPPS, SUBSEQ VISIT: HCPCS | Performed by: INTERNAL MEDICINE

## 2024-06-14 RX ORDER — ROSUVASTATIN CALCIUM 10 MG/1
10 TABLET, COATED ORAL DAILY
Qty: 90 TABLET | Refills: 3 | Status: SHIPPED | OUTPATIENT
Start: 2024-06-14

## 2024-06-14 NOTE — PROGRESS NOTES
Subjective:   Darline Acevedo is a 83 year old female who presents for a Medicare Subsequent Annual Wellness visit (Pt already had Initial Annual Wellness) and scheduled follow up of multiple significant but stable problems.   HPI:  Normal state of health    No cp or sob    PAST MEDICAL, SOCIAL, FAMILY HISTORIES REVIEWED WITH PT      History/Other:   Fall Risk Assessment:   She has been screened for Falls and is low risk.      Cognitive Assessment:   She had a completely normal cognitive assessment - see flowsheet entries     Functional Ability/Status:   Darline Acevedo has a completely normal functional assessment. See flowsheet for details.      Depression Screening (PHQ-2/PHQ-9): PHQ-2 SCORE: 0  , done 6/14/2024            Advanced Directives:   She does NOT have a Living Will. [Do you have a living will?: No]  She does have a POA but we do NOT have it on file in Epic.    Discussed Advance Care Planning with patient (and family/surrogate if present). Standard forms made available to patient in After Visit Summary.      Patient Active Problem List   Diagnosis    Urge incontinence    Essential hypertension    RLS (restless legs syndrome)    Sx 5/21/19, Left Achilles Repair, Exc [place], Gastroc Soleus Lengthening V-Y w/ Dr. Panchal [employer] Exp 8/19/19    History of adenomatous polyp of colon    CKD stage 3a, GFR 45-59 ml/min ([facility])     Allergies:  She is allergic to zocor [simvastatin - high dose] and zosyn.    Current Medications:  Outpatient Medications Marked as Taking for the 6/14/24 encounter (Office Visit) with Simón House MD   Medication Sig    rosuvastatin 10 MG Oral Tab Take 1 tablet (10 mg total) by mouth daily.    losartan 100 MG Oral Tab Take 1 tablet (100 mg total) by mouth daily.    Mirabegron ER 50 MG Oral Tablet 24 Hr Take 1 tablet (50 mg total) by mouth daily.    Vitamin D3, Cholecalciferol, 1000 UNITS Oral Cap Take 1 capsule (1,000 Units total) by mouth daily.    PREVIDENT 5000 BOOSTER PLUS 1.1  % Dental Paste USE TWICE DAILY AS INSTRUCTED    naproxen 220 MG Oral Tab Take 1-2 tablets (220-440 mg total) by mouth 2 (two) times daily as needed.    Calcium Carbonate-Vitamin D 600-200 MG-UNIT Oral Tab Take 1 tablet by mouth 2 (two) times a day.      Multiple Vitamin (MULTIVITAMINS OR) Take  by mouth.       Medical History:  She  has a past medical history of Abdominal pain, Actinic keratosis, Arthritis, Back pain, Constipation, Displacement of lumbar intervertebral disc without myelopathy, Frequent urination, Frequent use of laxatives, Hearing loss, Heartburn, Hemorrhoids, Hypertonicity of bladder, Indigestion, Irregular bowel habits, Leaking of urine, Microscopic hematuria, Osteoarthritis, Pain in joints, Prediabetes, Problems with swallowing, Stool incontinence, Unspecified hemorrhoids with other complication, and Vitamin D deficiency.  Surgical History:  She  has a past surgical history that includes colonoscopy (10/28/2005); other surgical history (2010); appendectomy (2009); tonsillectomy; ; hysterectomy (1998); appendectomy (); colonoscopy (); and total abdom hysterectomy ().   Family History:  Her family history includes Carotid artery stenosis in her father; Colon Cancer in her maternal aunt; Diabetes mellitus in her brother; MI in her father; Valvular heart disease, htn in her brother; crest syndrome in her mother.  Social History:  She  reports that she has never smoked. She has never used smokeless tobacco. She reports current alcohol use. She reports that she does not use drugs.    Tobacco:  She has never smoked tobacco.    CAGE Alcohol Screen:   CAGE screening score of 0 on 2024, showing low risk of alcohol abuse.      Patient Care Team:  Simón House MD as PCP - General (Internal Medicine)  Leda Rowan PT as Physical Therapist  Josh Clifford MD (GASTROENTEROLOGY)  Princess Lopez DO (Urogynecology)    Review of Systems  A comprehensive 10point  review of systems was completed.     Pertinent positives and negatives noted in the HPI.      Objective:   Physical Exam  General: No acute distress. Alert and oriented x 3.  HEENT: Normocephalic atraumatic.   Respiratory: Clear to auscultation bilaterally. No wheezes. No rhonchi.  Cardiovascular: S1, S2. Regular rate and rhythm.   Abdomen: Soft,  no pain.  nontender, nondistended.  Positive bowel sounds. .  Neurologic: No focal neurological deficits.   Musculoskeletal: Moves all extremities.  Extremities: No edema or cyanosis.  Integument: No rashes or lesions.    Psychiatric: Appropriate mood and affect.      /70   Pulse 80   Temp 98 °F (36.7 °C)   Resp 14   Ht 5' 4\" (1.626 m)   Wt 200 lb (90.7 kg)   SpO2 98%   BMI 34.33 kg/m²  Estimated body mass index is 34.33 kg/m² as calculated from the following:    Height as of this encounter: 5' 4\" (1.626 m).    Weight as of this encounter: 200 lb (90.7 kg).    Medicare Hearing Assessment:   Hearing Screening    Screening Method: Whisper Test  Whisper Test Result: Pass               Assessment & Plan:   Darline Acevedo is a 83 year old female who presents for a Medicare Assessment.     1. Annual physical exam (Primary)  2. Essential hypertension  3. RLS (restless legs syndrome)  4. Urge incontinence  5. Encounter for annual health examination  6. CKD stage 3a, GFR 45-59 ml/min ([facility])  7. 10 year risk of MI or stroke 7.5% or greater  -     Rosuvastatin Calcium; Take 1 tablet (10 mg total) by mouth daily.  Dispense: 90 tablet; Refill: 3     Mixed hyperlipidemia/Low HDL (under 40)- stable. monitor     Urge incontinence- stable. Defer to urogyne     Essential hypertension-controlled. Cont current med therapy     RLS (restless legs syndrome)- stable. monitor     History of adenomatous polyp of colon- RENEE     CKD stage 3a, GFR 45-59 ml/min ([facility]) - stable. Avoid nephrotoxic meds    The patient indicates understanding of these issues and agrees to the plan.  Continue  with current treatment plan.  Lab work ordered.  Reinforced healthy diet, lifestyle, and exercise.      Return in about 6 months (around 12/14/2024).     Simón House MD, 6/14/2024     Supplementary Documentation:   General Health:  In the past six months, have you lost more than 10 pounds without trying?: 2 - No  Has your appetite been poor?: No  Type of Diet: Balanced  How does the patient maintain a good energy level?: Appropriate Exercise;Daily Walks  How would you describe your daily physical activity?: Heavy  How do you maintain positive mental well-being?: Visiting Family  Have you had any immunizations at another office such as Influenza, Hepatitis B, Tetanus, or Pneumococcal?: No       Darline Acevedo's SCREENING SCHEDULE   Tests on this list are recommended by your physician but may not be covered, or covered at this frequency, by your insurer.   Please check with your insurance carrier before scheduling to verify coverage.   PREVENTATIVE SERVICES FREQUENCY &  COVERAGE DETAILS LAST COMPLETION DATE   Diabetes Screening    Fasting Blood Sugar /  Glucose    One screening every 12 months if never tested or if previously tested but not diagnosed with pre-diabetes   One screening every 6 months if diagnosed with pre-diabetes Lab Results   Component Value Date    GLU 99 06/03/2024        Cardiovascular Disease Screening    Lipid Panel  Cholesterol  Lipoprotein (HDL)  Triglycerides Covered every 5 years for all Medicare beneficiaries without apparent signs or symptoms of cardiovascular disease Lab Results   Component Value Date    CHOLEST 120 06/03/2024    HDL 53 06/03/2024    LDL 49 06/03/2024    TRIG 96 06/03/2024         Electrocardiogram (EKG)   Covered if needed at Welcome to Medicare, and non-screening if indicated for medical reasons 05/22/2019      Ultrasound Screening for Abdominal Aortic Aneurysm (AAA) Covered once in a lifetime for one of the following risk factors    Men who are 65-75 years old and  have ever smoked    Anyone with a family history -     Colorectal Cancer Screening  Covered for ages 50-85; only need ONE of the following:    Colonoscopy   Covered every 10 years    Covered every 2 years if patient is at high risk or previous colonoscopy was abnormal 09/22/2020    Health Maintenance   Topic Date Due    Colorectal Cancer Screening  09/22/2030       Flexible Sigmoidoscopy   Covered every 4 years -    Fecal Occult Blood Test Covered annually -   Bone Density Screening    Bone density screening    Covered every 2 years after age 65 if diagnosed with risk of osteoporosis or estrogen deficiency.    Covered yearly for long-term glucocorticoid medication use (Steroids) Last Dexa Scan:    XR DEXA BONE DENSITOMETRY (CPT=77080) 08/30/2023      No recommendations at this time   Pap and Pelvic    Pap   Covered every 2 years for women at normal risk; Annually if at high risk -  No recommendations at this time    Chlamydia Annually if high risk -  No recommendations at this time   Screening Mammogram    Mammogram     Recommend annually for all female patients aged 40 and older    One baseline mammogram covered for patients aged 35-39 09/14/2023    No recommendations at this time    Immunizations    Influenza Covered once per flu season  Please get every year 10/08/2023  No recommendations at this time    Pneumococcal Each vaccine ([id number] & [id number]) covered once after 65 Prevnar 13: 04/14/2015    [id number]: 04/18/2017     No recommendations at this time    Hepatitis B One screening covered for patients with certain risk factors   -  No recommendations at this time    Tetanus Toxoid Not covered by Medicare Part B unless medically necessary (cut with metal); may be covered with your pharmacy prescription benefits 09/21/2009    Tetanus, Diptheria and Pertusis TD and TDaP Not covered by Medicare Part B -  No recommendations at this time    Zoster Not covered by Medicare Part B; may be covered with your  pharmacy  prescription benefits 07/01/2011  No recommendations at this time     Annual Monitoring of Persistent Medications (ACE/ARB, digoxin diuretics, anticonvulsants)    Potassium Annually Lab Results   Component Value Date    K 3.9 06/03/2024         Creatinine   Annually Lab Results   Component Value Date    CREATSERUM 1.04 (H) 06/03/2024         BUN Annually Lab Results   Component Value Date    BUN 12 06/03/2024       Drug Serum Conc Annually No results found for: \"DIGOXIN\", \"DIG\", \"VALP\"

## 2024-06-14 NOTE — PATIENT INSTRUCTIONS
Darline Acevedo's SCREENING SCHEDULE   Tests on this list are recommended by your physician but may not be covered, or covered at this frequency, by your insurer.   Please check with your insurance carrier before scheduling to verify coverage.   PREVENTATIVE SERVICES FREQUENCY &  COVERAGE DETAILS LAST COMPLETION DATE   Diabetes Screening    Fasting Blood Sugar /  Glucose    One screening every 12 months if never tested or if previously tested but not diagnosed with pre-diabetes   One screening every 6 months if diagnosed with pre-diabetes Lab Results   Component Value Date    GLU 99 06/03/2024        Cardiovascular Disease Screening    Lipid Panel  Cholesterol  Lipoprotein (HDL)  Triglycerides Covered every 5 years for all Medicare beneficiaries without apparent signs or symptoms of cardiovascular disease Lab Results   Component Value Date    CHOLEST 120 06/03/2024    HDL 53 06/03/2024    LDL 49 06/03/2024    TRIG 96 06/03/2024         Electrocardiogram (EKG)   Covered if needed at Welcome to Medicare, and non-screening if indicated for medical reasons 05/22/2019      Ultrasound Screening for Abdominal Aortic Aneurysm (AAA) Covered once in a lifetime for one of the following risk factors   • Men who are 65-75 years old and have ever smoked   • Anyone with a family history -     Colorectal Cancer Screening  Covered for ages 50-85; only need ONE of the following:    Colonoscopy   Covered every 10 years    Covered every 2 years if patient is at high risk or previous colonoscopy was abnormal 09/22/2020    Health Maintenance   Topic Date Due   • Colorectal Cancer Screening  09/22/2030       Flexible Sigmoidoscopy   Covered every 4 years -    Fecal Occult Blood Test Covered annually -   Bone Density Screening    Bone density screening    Covered every 2 years after age 65 if diagnosed with risk of osteoporosis or estrogen deficiency.    Covered yearly for long-term glucocorticoid medication use (Steroids) Last Dexa  Scan:    XR DEXA BONE DENSITOMETRY (CPT=77080) 08/30/2023      No recommendations at this time   Pap and Pelvic    Pap   Covered every 2 years for women at normal risk; Annually if at high risk -  No recommendations at this time    Chlamydia Annually if high risk -  No recommendations at this time   Screening Mammogram    Mammogram     Recommend annually for all female patients aged 40 and older    One baseline mammogram covered for patients aged 35-39 09/14/2023    No recommendations at this time    Immunizations    Influenza Covered once per flu season  Please get every year 10/08/2023  No recommendations at this time    Pneumococcal Each vaccine (Zlomtcs18 & Uplaneyhs54) covered once after 65 Prevnar 13: 04/14/2015    [id number]: 04/18/2017     No recommendations at this time    Hepatitis B One screening covered for patients with certain risk factors   -  No recommendations at this time    Tetanus Toxoid Not covered by Medicare Part B unless medically necessary (cut with metal); may be covered with your pharmacy prescription benefits 09/21/2009    Tetanus, Diptheria and Pertusis TD and TDaP Not covered by Medicare Part B -  No recommendations at this time    Zoster Not covered by Medicare Part B; may be covered with your pharmacy  prescription benefits 07/01/2011  No recommendations at this time     Annual Monitoring of Persistent Medications (ACE/ARB, digoxin diuretics, anticonvulsants)    Potassium Annually Lab Results   Component Value Date    K 3.9 06/03/2024         Creatinine   Annually Lab Results   Component Value Date    CREATSERUM 1.04 (H) 06/03/2024         BUN Annually Lab Results   Component Value Date    BUN 12 06/03/2024       Drug Serum Conc Annually No results found for: \"DIGOXIN\", \"DIG\", \"VALP\"

## 2024-08-19 ENCOUNTER — TELEPHONE (OUTPATIENT)
Dept: INTERNAL MEDICINE CLINIC | Facility: CLINIC | Age: 84
End: 2024-08-19

## 2024-08-19 DIAGNOSIS — Z12.31 ENCOUNTER FOR SCREENING MAMMOGRAM FOR MALIGNANT NEOPLASM OF BREAST: Primary | ICD-10-CM

## 2024-08-19 NOTE — TELEPHONE ENCOUNTER
PT needs a order for [facility] JANICE 2D+3D DIAGNOSTIC ADDL VWS RIGHT (QVX=52743/99611) [[id number]] (Accession [id number]) (Order [id number])

## 2024-08-19 NOTE — TELEPHONE ENCOUNTER
RECOMMENDATIONS:    ROUTINE MAMMOGRAM AND CLINICAL EVALUATION IN 12 MONTHS.     -Mammogram order placed.

## 2024-09-16 ENCOUNTER — HOSPITAL ENCOUNTER (OUTPATIENT)
Dept: MAMMOGRAPHY | Facility: HOSPITAL | Age: 84
Discharge: HOME | End: 2024-09-16
Attending: INTERNAL MEDICINE
Payer: MEDICARE

## 2024-09-16 DIAGNOSIS — Z12.31 ENCOUNTER FOR SCREENING MAMMOGRAM FOR MALIGNANT NEOPLASM OF BREAST: ICD-10-CM

## 2024-09-16 PROCEDURE — 77063 BREAST TOMOSYNTHESIS BI: CPT | Performed by: INTERNAL MEDICINE

## 2024-09-16 PROCEDURE — 77067 SCR MAMMO BI INCL CAD: CPT | Performed by: INTERNAL MEDICINE

## 2024-11-07 DIAGNOSIS — I10 ESSENTIAL HYPERTENSION: ICD-10-CM

## 2024-11-07 RX ORDER — LOSARTAN POTASSIUM 100 MG/1
100 TABLET ORAL DAILY
Qty: 90 TABLET | Refills: 0 | Status: SHIPPED | OUTPATIENT
Start: 2024-11-07

## 2024-11-07 NOTE — TELEPHONE ENCOUNTER
Last time medication was refilled 05/03/2024  Last office visit  06/14/2024  Next office visit due/scheduled   Future Appointments   Date Time Provider Department Center   12/17/2024  9:45 AM Simón House MD [facility] [address]     Medication passed protocol, refill sent.

## 2024-12-16 NOTE — PROGRESS NOTES
Subjective:   Patient ID: Darline Acevedo is a 84 year old female.    HPI  Darline Acevedo is a 84 year old female.     HPI:   Patient presents for recheck of her hypertension. Pt has been taking medications as instructed, no medication side effects, home BP monitoring in the range of 130's systolic and 70's diastolic.    Wt Readings from Last 6 Encounters:   12/17/24 198 lb (89.8 kg)   06/14/24 200 lb (90.7 kg)   12/14/23 200 lb (90.7 kg)   07/13/23 199 lb (90.3 kg)   06/19/23 199 lb (90.3 kg)   06/01/23 196 lb (88.9 kg)     Body mass index is 33.99 kg/m².    Lab Results   Component Value Date    CHOLEST 120 06/03/2024    CHOLEST 117 06/14/2023    CHOLEST 115 06/15/2022     Lab Results   Component Value Date    HDL 53 06/03/2024    HDL 50 06/14/2023    HDL 48 06/15/2022     Lab Results   Component Value Date    TRIG 96 06/03/2024    TRIG 86 06/14/2023    TRIG 82 06/15/2022     Lab Results   Component Value Date    LDL 49 06/03/2024    LDL 50 06/14/2023    LDL 51 06/15/2022     Lab Results   Component Value Date    AST 19 06/03/2024    AST 22 06/14/2023    AST 23 06/15/2022     Lab Results   Component Value Date    ALT 22 06/03/2024    ALT 25 06/14/2023    ALT 30 06/15/2022     Lab Results   Component Value Date    GLU 99 06/03/2024    GLU 97 06/14/2023     (H) 06/15/2022       Current Outpatient Medications   Medication Sig Dispense Refill    losartan 100 MG Oral Tab Take 1 tablet (100 mg total) by mouth daily. 90 tablet 0    rosuvastatin 10 MG Oral Tab Take 1 tablet (10 mg total) by mouth daily. 90 tablet 3    Mirabegron ER 50 MG Oral Tablet 24 Hr Take 1 tablet (50 mg total) by mouth daily.      Vitamin D3, Cholecalciferol, 1000 UNITS Oral Cap Take 1 capsule (1,000 Units total) by mouth daily.      PREVIDENT 5000 BOOSTER PLUS 1.1 % Dental Paste USE TWICE DAILY AS INSTRUCTED      naproxen 220 MG Oral Tab Take 1-2 tablets (220-440 mg total) by mouth 2 (two) times daily as needed.      Calcium  Carbonate-Vitamin D 600-200 MG-UNIT Oral Tab Take 1 tablet by mouth 2 (two) times a day.        Multiple Vitamin (MULTIVITAMINS OR) Take  by mouth.        Past Medical History:    Abdominal pain    Actinic keratosis    Arthritis    Back pain    Constipation    Displacement of lumbar intervertebral disc without myelopathy    L3-L4    Frequent urination    Frequent use of laxatives    Hearing loss    Heartburn    Hemorrhoids    Hypertonicity of bladder    Indigestion    Irregular bowel habits    Leaking of urine    Microscopic hematuria    Osteoarthritis    Pain in joints    Prediabetes    Problems with swallowing    Stool incontinence    Unspecified hemorrhoids with other complication    with bleeding    Vitamin D deficiency      Past Surgical History:   Procedure Laterality Date    Appendectomy  2009    Ruptured    Appendectomy  2008    ruptured and removed    Colonoscopy  10/28/2005    Recheck 10 years    Colonoscopy  2015    Hysterectomy  1998    Vaginal hysterectomy for prolapsed uterus, ovaries intact          X2    Other surgical history  2010    nerve block transforaminal epidural lumbar L3-L4    Tonsillectomy      with adenoidectomy    Total abdom hysterectomy      vaginal/ ovaries not removed      Social History:    Social History     Socioeconomic History    Marital status:     Number of children: 2   Occupational History    Occupation: retired coordinator      Comment: [employer]   Tobacco Use    Smoking status: Never    Smokeless tobacco: Never   Vaping Use    Vaping status: Never Used   Substance and Sexual Activity    Alcohol use: Yes     Alcohol/week: 0.0 standard drinks of alcohol     Comment: seldom    Drug use: No   Other Topics Concern    Caffeine Concern Yes    Exercise Yes     Comment: 3-5x wk     Social Drivers of Health      [facility]    Housing Stability         REVIEW OF  SYSTEMS:   GENERAL HEALTH: feels well otherwise  SKIN: denies any unusual skin lesions or rashes  RESPIRATORY: denies shortness of breath with exertion  CARDIOVASCULAR: denies chest pain on exertion  GI: denies abdominal pain and denies heartburn  NEURO: denies headaches    EXAM:   /80   Pulse 83   Temp 97.4 °F (36.3 °C)   Resp 16   Ht 5' 4\" (1.626 m)   Wt 198 lb (89.8 kg)   SpO2 97%   BMI 33.99 kg/m²   GENERAL: well developed, well nourished,in no apparent distress  SKIN: no rashes,no suspicious lesions  HEENT: atraumatic, normocephalic,ears and throat are clear  NECK: supple,no adenopathy,no bruits  LUNGS: clear to auscultation  CARDIO: RRR without murmur  GI: good BS's,no masses, HSM or tenderness  EXTREMITIES: no cyanosis, clubbing or edema    ASSESSMENT AND PLAN:   Pt presents for a recheck of her hypertension. BP is well controlled, no significant medication side effects noted.  PLAN: will continue present medications, reviewed diet, exercise and weight control. The patient indicates understanding of these issues and agrees to the plan.  The patient is asked to return in 6 m.    History/Other:   Review of Systems  Current Outpatient Medications   Medication Sig Dispense Refill    losartan 100 MG Oral Tab Take 1 tablet (100 mg total) by mouth daily. 90 tablet 0    rosuvastatin 10 MG Oral Tab Take 1 tablet (10 mg total) by mouth daily. 90 tablet 3    Mirabegron ER 50 MG Oral Tablet 24 Hr Take 1 tablet (50 mg total) by mouth daily.      Vitamin D3, Cholecalciferol, 1000 UNITS Oral Cap Take 1 capsule (1,000 Units total) by mouth daily.      PREVIDENT 5000 BOOSTER PLUS 1.1 % Dental Paste USE TWICE DAILY AS INSTRUCTED      naproxen 220 MG Oral Tab Take 1-2 tablets (220-440 mg total) by mouth 2 (two) times daily as needed.      Calcium Carbonate-Vitamin D 600-200 MG-UNIT Oral Tab Take 1 tablet by mouth 2 (two) times a day.        Multiple Vitamin (MULTIVITAMINS OR) Take  by mouth.        Allergies:Allergies[1]    Objective:   Physical Exam    Assessment & Plan:   1. Essential hypertension        No orders of the defined types were placed in this encounter.      Meds This Visit:  Requested Prescriptions      No prescriptions requested or ordered in this encounter       Imaging & Referrals:  None         [1]   Allergies  Allergen Reactions    Zocor [Simvastatin - High Dose] MYALGIA     Tabs      Zosyn RASH     SOLN

## 2024-12-17 ENCOUNTER — OFFICE VISIT (OUTPATIENT)
Dept: INTERNAL MEDICINE CLINIC | Facility: CLINIC | Age: 84
End: 2024-12-17
Payer: MEDICARE

## 2024-12-17 VITALS
OXYGEN SATURATION: 97 % | HEIGHT: 64 IN | DIASTOLIC BLOOD PRESSURE: 80 MMHG | HEART RATE: 83 BPM | BODY MASS INDEX: 33.8 KG/M2 | SYSTOLIC BLOOD PRESSURE: 138 MMHG | WEIGHT: 198 LBS | TEMPERATURE: 97 F | RESPIRATION RATE: 16 BRPM

## 2024-12-17 DIAGNOSIS — Z91.89 10 YEAR RISK OF MI OR STROKE 7.5% OR GREATER: ICD-10-CM

## 2024-12-17 DIAGNOSIS — I10 ESSENTIAL HYPERTENSION: Primary | ICD-10-CM

## 2024-12-17 PROCEDURE — 99213 OFFICE O/P EST LOW 20 MIN: CPT | Performed by: INTERNAL MEDICINE

## 2024-12-17 RX ORDER — ROSUVASTATIN CALCIUM 10 MG/1
10 TABLET, COATED ORAL DAILY
Qty: 90 TABLET | Refills: 3 | Status: SHIPPED | OUTPATIENT
Start: 2024-12-17

## 2024-12-17 RX ORDER — LOSARTAN POTASSIUM 100 MG/1
100 TABLET ORAL DAILY
Qty: 90 TABLET | Refills: 3 | Status: SHIPPED | OUTPATIENT
Start: 2024-12-17

## 2025-01-05 ENCOUNTER — HOSPITAL ENCOUNTER (OUTPATIENT)
Age: 85
Discharge: HOME | End: 2025-01-05
Payer: MEDICARE

## 2025-01-05 VITALS
RESPIRATION RATE: 18 BRPM | WEIGHT: 198 LBS | HEART RATE: 72 BPM | TEMPERATURE: 98 F | BODY MASS INDEX: 34 KG/M2 | DIASTOLIC BLOOD PRESSURE: 75 MMHG | SYSTOLIC BLOOD PRESSURE: 186 MMHG | OXYGEN SATURATION: 98 %

## 2025-01-05 DIAGNOSIS — L98.9 SKIN LESION: Primary | ICD-10-CM

## 2025-01-05 RX ORDER — CLOTRIMAZOLE AND BETAMETHASONE DIPROPIONATE 10; .64 MG/G; MG/G
1 CREAM TOPICAL 2 TIMES DAILY
Qty: 1 EACH | Refills: 0 | Status: SHIPPED | OUTPATIENT
Start: 2025-01-05 | End: 2025-01-15

## 2025-01-05 NOTE — ED PROVIDER NOTES
Patient Seen in: [facility]      History     Chief Complaint   Patient presents with    Lesion     Stated Complaint: spot on right leg    Subjective:   HPI  84-year-old presents complaining of a red scaly raised lesion to her right shin since .    Objective:     Past Medical History:    Abdominal pain    Actinic keratosis    Arthritis    Back pain    Constipation    Displacement of lumbar intervertebral disc without myelopathy    L3-L4    Frequent urination    Frequent use of laxatives    Hearing loss    Heartburn    Hemorrhoids    Hypertonicity of bladder    Indigestion    Irregular bowel habits    Leaking of urine    Microscopic hematuria    Osteoarthritis    Pain in joints    Prediabetes    Problems with swallowing    Stool incontinence    Unspecified hemorrhoids with other complication    with bleeding    Vitamin D deficiency              Past Surgical History:   Procedure Laterality Date    Appendectomy  2009    Ruptured    Appendectomy      ruptured and removed    Colonoscopy  10/28/2005    Recheck 10 years    Colonoscopy  2015    Hysterectomy  1998    Vaginal hysterectomy for prolapsed uterus, ovaries intact          X2    Other surgical history  2010    nerve block transforaminal epidural lumbar L3-L4    Tonsillectomy      with adenoidectomy    Total abdom hysterectomy      vaginal/ ovaries not removed                Social History     Socioeconomic History    Marital status:     Number of children: 2   Occupational History    Occupation: retired coordinator      Comment: [employer]   Tobacco Use    Smoking status: Never    Smokeless tobacco: Never   Vaping Use    Vaping status: Never Used   Substance and Sexual Activity    Alcohol use: Yes     Alcohol/week: 0.0 standard drinks of alcohol     Comment: seldom    Drug use: No   Other Topics Concern    Caffeine Concern Yes    Exercise Yes     Comment: 3-5x wk     Social Drivers of  Health      [facility]    Housing Stability              Review of Systems   All other systems reviewed and are negative.      Positive for stated complaint: spot on right leg  Other systems are as noted in HPI.  Constitutional and vital signs reviewed.      All other systems reviewed and negative except as noted above.    Physical Exam     ED Triage Vitals [01/05/25 1022]   BP (!) 186/75   Pulse 72   Resp 18   Temp 98.1 °F (36.7 °C)   Temp src Oral   SpO2 98 %   O2 Device None (Room air)       Current Vitals:   Vital Signs  BP: (!) 186/75  Pulse: 72  Resp: 18  Temp: 98.1 °F (36.7 °C)  Temp src: Oral    Oxygen Therapy  SpO2: 98 %  O2 Device: None (Room air)        Physical Exam  Vitals and nursing note reviewed.   Constitutional:       Appearance: She is well-developed.   Cardiovascular:      Rate and Rhythm: Normal rate.   Pulmonary:      Effort: Pulmonary effort is normal.   Skin:     General: Skin is warm and dry.      Comments: Right shin with erythematous quarter sized scaly raised lesion that is circular   Neurological:      Mental Status: She is alert and oriented to person, place, and time.           ED Course   Labs Reviewed - No data to display                MDM     Medical Decision Making  84-year-old presents complaining of a red scaly raised lesion to her right shin since 12/24.    Patient coming in with skin lesion.   Differential diagnosis includes but not limited to skin lesion, tinea, contact dermatitis  Will treat for skin lesion.  Will discharge on clotrimazole/betamethasone cream with follow-up with dermatology. Patient/Parent is comfortable with this plan.    Overall Pt looks good. Non-toxic, well-hydrated and in no respiratory distress. Vital signs are reassuring. Exam is reassuring. I do not believe pt requires and additional diagnostic studies or intervention. I believe pt can be discharged home to continue evaluation as an  outpatient. Follow-up provider given. Discharge instructions given and reviewed. Return for any problems. All understand and agree with the plan.        Problems Addressed:  Skin lesion: acute illness or injury        Disposition and Plan     Clinical Impression:  1. Skin lesion         Disposition:  Discharge  1/5/2025 10:29 am    Follow-up:  Madeline Cornelius MD  [address] [place] [id number]  [phone]    Call             Medications Prescribed:  Discharge Medication List as of 1/5/2025 10:30 AM        START taking these medications    Details   clotrimazole-betamethasone 1-0.05 % External Cream Apply 1 Application topically 2 (two) times daily for 10 days., Normal, Disp-1 each, R-0                 Supplementary Documentation:

## 2025-06-25 ENCOUNTER — LAB ENCOUNTER (OUTPATIENT)
Dept: LAB | Age: 85
End: 2025-06-25
Attending: INTERNAL MEDICINE
Payer: MEDICARE

## 2025-06-25 DIAGNOSIS — I10 ESSENTIAL HYPERTENSION: ICD-10-CM

## 2025-06-25 LAB
ALBUMIN SERPL-MCNC: 4.5 G/DL (ref 3.2–4.8)
ALBUMIN/GLOB SERPL: 1.8 {RATIO} (ref 1–2)
ALP LIVER SERPL-CCNC: 82 U/L (ref 55–142)
ALT SERPL-CCNC: 19 U/L (ref 10–49)
ANION GAP SERPL CALC-SCNC: 7 MMOL/L (ref 0–18)
AST SERPL-CCNC: 23 U/L (ref ?–34)
BASOPHILS # BLD AUTO: 0.06 X10(3) UL (ref 0–0.2)
BASOPHILS NFR BLD AUTO: 0.7 %
BILIRUB SERPL-MCNC: 0.7 MG/DL (ref 0.2–1.1)
BILIRUB UR QL STRIP.AUTO: NEGATIVE
BUN BLD-MCNC: 13 MG/DL (ref 9–23)
CALCIUM BLD-MCNC: 9.4 MG/DL (ref 8.7–10.6)
CHLORIDE SERPL-SCNC: 104 MMOL/L (ref 98–112)
CHOLEST SERPL-MCNC: 130 MG/DL (ref ?–200)
CLARITY UR REFRACT.AUTO: CLEAR
CO2 SERPL-SCNC: 28 MMOL/L (ref 21–32)
CREAT BLD-MCNC: 0.99 MG/DL (ref 0.55–1.02)
EGFRCR SERPLBLD CKD-EPI 2021: 57 ML/MIN/1.73M2 (ref 60–?)
EOSINOPHIL # BLD AUTO: 0.15 X10(3) UL (ref 0–0.7)
EOSINOPHIL NFR BLD AUTO: 1.6 %
ERYTHROCYTE [DISTWIDTH] IN BLOOD BY AUTOMATED COUNT: 13.8 %
FASTING PATIENT LIPID ANSWER: YES
FASTING STATUS PATIENT QL REPORTED: YES
GLOBULIN PLAS-MCNC: 2.5 G/DL (ref 2–3.5)
GLUCOSE BLD-MCNC: 100 MG/DL (ref 70–99)
GLUCOSE UR STRIP.AUTO-MCNC: NORMAL MG/DL
HCT VFR BLD AUTO: 42 % (ref 35–48)
HDLC SERPL-MCNC: 54 MG/DL (ref 40–59)
HGB BLD-MCNC: 13.8 G/DL (ref 12–16)
IMM GRANULOCYTES # BLD AUTO: 0.03 X10(3) UL (ref 0–1)
IMM GRANULOCYTES NFR BLD: 0.3 %
KETONES UR STRIP.AUTO-MCNC: NEGATIVE MG/DL
LDLC SERPL CALC-MCNC: 60 MG/DL (ref ?–100)
LEUKOCYTE ESTERASE UR QL STRIP.AUTO: NEGATIVE
LYMPHOCYTES # BLD AUTO: 2.93 X10(3) UL (ref 1–4)
LYMPHOCYTES NFR BLD AUTO: 32.1 %
MCH RBC QN AUTO: 28.4 PG (ref 26–34)
MCHC RBC AUTO-ENTMCNC: 32.9 G/DL (ref 31–37)
MCV RBC AUTO: 86.4 FL (ref 80–100)
MONOCYTES # BLD AUTO: 0.62 X10(3) UL (ref 0.1–1)
MONOCYTES NFR BLD AUTO: 6.8 %
NEUTROPHILS # BLD AUTO: 5.33 X10 (3) UL (ref 1.5–7.7)
NEUTROPHILS # BLD AUTO: 5.33 X10(3) UL (ref 1.5–7.7)
NEUTROPHILS NFR BLD AUTO: 58.5 %
NITRITE UR QL STRIP.AUTO: NEGATIVE
NONHDLC SERPL-MCNC: 76 MG/DL (ref ?–130)
OSMOLALITY SERPL CALC.SUM OF ELEC: 288 MOSM/KG (ref 275–295)
PH UR STRIP.AUTO: 7 [PH] (ref 5–8)
PLATELET # BLD AUTO: 308 10(3)UL (ref 150–450)
POTASSIUM SERPL-SCNC: 4.3 MMOL/L (ref 3.5–5.1)
PROT SERPL-MCNC: 7 G/DL (ref 5.7–8.2)
PROT UR STRIP.AUTO-MCNC: NEGATIVE MG/DL
RBC # BLD AUTO: 4.86 X10(6)UL (ref 3.8–5.3)
RBC UR QL AUTO: NEGATIVE
SODIUM SERPL-SCNC: 139 MMOL/L (ref 136–145)
SP GR UR STRIP.AUTO: 1.01 (ref 1–1.03)
TRIGL SERPL-MCNC: 80 MG/DL (ref 30–149)
TSI SER-ACNC: 1.61 UIU/ML (ref 0.55–4.78)
UROBILINOGEN UR STRIP.AUTO-MCNC: NORMAL MG/DL
VLDLC SERPL CALC-MCNC: 12 MG/DL (ref 0–30)
WBC # BLD AUTO: 9.1 X10(3) UL (ref 4–11)

## 2025-06-25 PROCEDURE — 84443 ASSAY THYROID STIM HORMONE: CPT

## 2025-06-25 PROCEDURE — 81003 URINALYSIS AUTO W/O SCOPE: CPT

## 2025-06-25 PROCEDURE — 85025 COMPLETE CBC W/AUTO DIFF WBC: CPT

## 2025-06-25 PROCEDURE — 80061 LIPID PANEL: CPT

## 2025-06-25 PROCEDURE — 80053 COMPREHEN METABOLIC PANEL: CPT

## 2025-06-25 PROCEDURE — 36415 COLL VENOUS BLD VENIPUNCTURE: CPT

## 2025-06-30 ENCOUNTER — OFFICE VISIT (OUTPATIENT)
Dept: INTERNAL MEDICINE CLINIC | Facility: CLINIC | Age: 85
End: 2025-06-30
Payer: MEDICARE

## 2025-06-30 VITALS
HEIGHT: 64 IN | DIASTOLIC BLOOD PRESSURE: 70 MMHG | OXYGEN SATURATION: 97 % | HEART RATE: 71 BPM | SYSTOLIC BLOOD PRESSURE: 130 MMHG | WEIGHT: 200 LBS | BODY MASS INDEX: 34.15 KG/M2 | TEMPERATURE: 98 F | RESPIRATION RATE: 16 BRPM

## 2025-06-30 DIAGNOSIS — Z12.31 ENCOUNTER FOR SCREENING MAMMOGRAM FOR MALIGNANT NEOPLASM OF BREAST: ICD-10-CM

## 2025-06-30 DIAGNOSIS — Z00.00 ANNUAL PHYSICAL EXAM: Primary | ICD-10-CM

## 2025-06-30 DIAGNOSIS — N39.41 URGE INCONTINENCE: ICD-10-CM

## 2025-06-30 DIAGNOSIS — G25.81 RLS (RESTLESS LEGS SYNDROME): ICD-10-CM

## 2025-06-30 DIAGNOSIS — Z91.89 10 YEAR RISK OF MI OR STROKE 7.5% OR GREATER: ICD-10-CM

## 2025-06-30 DIAGNOSIS — Z78.0 POSTMENOPAUSAL: ICD-10-CM

## 2025-06-30 DIAGNOSIS — I10 ESSENTIAL HYPERTENSION: ICD-10-CM

## 2025-06-30 DIAGNOSIS — Z00.00 ENCOUNTER FOR ANNUAL HEALTH EXAMINATION: ICD-10-CM

## 2025-06-30 DIAGNOSIS — N18.31 CKD STAGE 3A, GFR 45-59 ML/MIN (HCC): ICD-10-CM

## 2025-06-30 RX ORDER — ROSUVASTATIN CALCIUM 10 MG/1
10 TABLET, COATED ORAL DAILY
Qty: 90 TABLET | Refills: 3 | Status: SHIPPED | OUTPATIENT
Start: 2025-06-30

## 2025-06-30 NOTE — PROGRESS NOTES
Subjective:   Darline Acevedo is a 84 year old female who presents for a Subsequent Annual Wellness visit (Pt already had Initial Annual Wellness) and scheduled follow up of multiple significant but stable problems.             HPI  Normal state of health  Denies cp or sob    PAST MEDICAL, SOCIAL, FAMILY HISTORIES REVIEWED WITH PT    History/Other:   Fall Risk Assessment:   She has been screened for Falls and is low risk.      Cognitive Assessment:   She had a completely normal cognitive assessment - see flowsheet entries       Functional Ability/Status:   Darline Acevedo has some abnormal functions as listed below:  She has Hearing problems based on screening of functional status.She has Vision problems based on screening of functional status.       Depression Screening (PHQ):  PHQ-2 SCORE: 0  , done 6/30/2025            Advanced Directives:   She does have a Living Will but we do NOT have it on file in Epic.    She does have a POA but we do NOT have it on file in Epic.    Discussed Advance Care Planning with patient (and family/surrogate if present). Standard forms made available to patient in After Visit Summary.      Problem List[1]  Allergies:  She is allergic to zocor [simvastatin - high dose] and zosyn.    Current Medications:  Active Meds, Sig Only[2]    Medical History:  She  has a past medical history of Abdominal pain, Actinic keratosis, Arthritis, Back pain, Constipation, Displacement of lumbar intervertebral disc without myelopathy, Frequent urination, Frequent use of laxatives, Hearing loss, Heartburn, Hemorrhoids, Hypertonicity of bladder, Indigestion, Irregular bowel habits, Leaking of urine, Microscopic hematuria, Osteoarthritis, Pain in joints, Prediabetes, Problems with swallowing, Stool incontinence, Unspecified hemorrhoids with other complication, and Vitamin D deficiency.  Surgical History:  She  has a past surgical history that includes colonoscopy (10/28/2005); other surgical history  (2010); appendectomy (2009); tonsillectomy; ; hysterectomy (1998); appendectomy (); colonoscopy (); and total abdom hysterectomy ().   Family History:  Her family history includes Carotid artery stenosis in her father; Colon Cancer in her maternal aunt; Diabetes mellitus in her brother; MI in her father; Valvular heart disease, htn in her brother; crest syndrome in her mother.  Social History:  She  reports that she has never smoked. She has never used smokeless tobacco. She reports current alcohol use. She reports that she does not use drugs.    Tobacco:  She has never smoked tobacco.    CAGE Alcohol Screen:   CAGE screening score of 0 on 2025, showing low risk of alcohol abuse.      Patient Care Team:  Simón House MD as PCP - General (Internal Medicine)  Leda Rowan PT as Physical Therapist  Josh Clifford MD (GASTROENTEROLOGY)  Princess Lopez DO (Urogynecology)    Review of Systems  A comprehensive 10 point review of systems was completed.     Pertinent positives and negatives noted in the HPI.      Objective:   Physical Exam  General: No acute distress. Alert and oriented x 3.  HEENT: Normocephalic atraumatic.   Respiratory: Clear to auscultation bilaterally. No wheezes. No rhonchi.  Cardiovascular: S1, S2. Regular rate and rhythm.   Abdomen: Soft,  no pain.  nontender, nondistended.  Positive bowel sounds. .  Extremities: No edema or cyanosis.  Integument: No rashes or lesions.    Psychiatric: Appropriate mood and affect.      Pulse 71   Resp 16   Ht 5' 4\" (1.626 m)   SpO2 97%   BMI 33.99 kg/m²  Estimated body mass index is 33.99 kg/m² as calculated from the following:    Height as of this encounter: 5' 4\" (1.626 m).    Weight as of 25: 198 lb (89.8 kg).    Medicare Hearing Assessment:   Hearing Screening    Screening Method: Whisper Test  Whisper Test Result: Pass               Assessment & Plan:   Darline Acevedo is a 84 year old female who  presents for a Medicare Assessment.     1. Annual physical exam (Primary)  2. CKD stage 3a, GFR 45-59 ml/min (HCC)  3. Essential hypertension  4. RLS (restless legs syndrome)  5. Urge incontinence  6. Encounter for annual health examination            Mixed hyperlipidemia/Low HDL (under 40)- stable. monitor     Urge incontinence- stable. Defer to urogyne     Essential hypertension-controlled. Cont current med therapy     RLS (restless legs syndrome)- stable. monitor     History of adenomatous polyp of colon- RENEE     CKD stage 3a, GFR 45-59 ml/min (HCC) - stable. Avoid nephrotoxic med    The patient indicates understanding of these issues and agrees to the plan.  Continue with current treatment plan.  Lab work ordered.  Reinforced healthy diet, lifestyle, and exercise.      No follow-ups on file.     Simón House MD, 6/29/2025     Supplementary Documentation:   General Health:  In the past six months, have you lost more than 10 pounds without trying?: 2 - No  Has your appetite been poor?: No  Type of Diet: Balanced  How does the patient maintain a good energy level?: Daily Walks  How would you describe your daily physical activity?: Light  How would you describe your current health state?: Good  How do you maintain positive mental well-being?: Social Interaction, Puzzles, Games, Visiting Friends, Visiting Family  On a scale of 0 to 10, with 0 being no pain and 10 being severe pain, what is your pain level?: 0 - (None)  In the past six months, have you experienced urine leakage?: 1-Yes  At any time do you feel concerned for the safety/well-being of yourself and/or your children, in your home or elsewhere?: No  Have you had any immunizations at another office such as Influenza, Hepatitis B, Tetanus, or Pneumococcal?: No    Health Maintenance   Topic Date Due    Annual Depression Screening  01/01/2025    COVID-19 Vaccine (8 - 2024-25 season) 04/08/2025    Annual Physical  06/14/2025    Colorectal Cancer Screening   09/22/2030    Influenza Vaccine  Completed    DEXA Scan  Completed    Fall Risk Screening (Annual)  Completed    Pneumococcal Vaccine: 50+ Years  Completed    Zoster Vaccines  Completed    Meningococcal B Vaccine  Aged Out            [1]   Patient Active Problem List  Diagnosis    Urge incontinence    Essential hypertension    RLS (restless legs syndrome)    Sx 5/21/19, Left Achilles Repair, Exc Hagl[place], Gastroc Soleus Lengthening V-Y w/ Dr. Panchal [employer] Exp 8/19/19    History of adenomatous polyp of colon    CKD stage 3a, GFR 45-59 ml/min ([facility])   [2]   Outpatient Medications Marked as Taking for the 6/30/25 encounter (Office Visit) with Simón House MD   Medication Sig    rosuvastatin 10 MG Oral Tab Take 1 tablet (10 mg total) by mouth daily.    losartan 100 MG Oral Tab Take 1 tablet (100 mg total) by mouth daily.    Mirabegron ER 50 MG Oral Tablet 24 Hr Take 1 tablet (50 mg total) by mouth daily.    Vitamin D3, Cholecalciferol, 1000 UNITS Oral Cap Take 1 capsule (1,000 Units total) by mouth daily.    PREVIDENT 5000 BOOSTER PLUS 1.1 % Dental Paste USE TWICE DAILY AS INSTRUCTED    naproxen 220 MG Oral Tab Take 1-2 tablets (220-440 mg total) by mouth 2 (two) times daily as needed.    Calcium Carbonate-Vitamin D 600-200 MG-UNIT Oral Tab Take 1 tablet by mouth 2 (two) times a day.      Multiple Vitamin (MULTIVITAMINS OR) Take  by mouth.

## 2025-07-23 ENCOUNTER — HOSPITAL ENCOUNTER (OUTPATIENT)
Age: 85
Discharge: HOME | End: 2025-07-23
Payer: MEDICARE

## 2025-07-23 VITALS
DIASTOLIC BLOOD PRESSURE: 61 MMHG | RESPIRATION RATE: 18 BRPM | HEART RATE: 77 BPM | OXYGEN SATURATION: 96 % | TEMPERATURE: 98 F | SYSTOLIC BLOOD PRESSURE: 143 MMHG

## 2025-07-23 DIAGNOSIS — B02.9 HERPES ZOSTER WITHOUT COMPLICATION: Primary | ICD-10-CM

## 2025-07-23 PROCEDURE — 99213 OFFICE O/P EST LOW 20 MIN: CPT | Performed by: NURSE PRACTITIONER

## 2025-07-23 RX ORDER — VALACYCLOVIR HYDROCHLORIDE 1 G/1
1000 TABLET, FILM COATED ORAL 3 TIMES DAILY
Qty: 21 TABLET | Refills: 0 | Status: SHIPPED | OUTPATIENT
Start: 2025-07-23 | End: 2025-07-30

## 2025-07-23 NOTE — ED PROVIDER NOTES
Patient Seen in: [facility]       The following individual(s) verbally consented to be recorded using ambient AI listening technology and understand that they can each withdraw their consent to this listening technology at any point by asking the clinician to turn off or pause the recording:    Patient name: Darline Acevedo        History  Chief Complaint   Patient presents with    Rash     Stated Complaint: rash    Subjective:   HPI     Darlien Acevedo is an 84 year old female who presents with a vesicular rash, suspecting shingles.    She noticed a spot on her body on Monday, initially thinking it was a bug bite due to itchiness. The spot has since increased in size, leading her to suspect shingles.    She has received shingles vaccinations in the past, which she believes might have lessened the severity of the outbreak. She recalls having a similar outbreak approximately thirty years ago below her jawline, in the same dermatome.    The rash is located in an area where her bra might rub, causing discomfort, especially when wearing a bra. She had to remove it while watching TV due to irritation. She has been using calamine lotion for relief and is considering wearing a cotton nightgown without a bra to avoid irritation.            Objective:     Past Medical History:    Abdominal pain    Actinic keratosis    Arthritis    Back pain    Constipation    Displacement of lumbar intervertebral disc without myelopathy    L3-L4    Frequent urination    Frequent use of laxatives    Hearing loss    Heartburn    Hemorrhoids    Hypertonicity of bladder    Indigestion    Irregular bowel habits    Leaking of urine    Microscopic hematuria    Osteoarthritis    Pain in joints    Prediabetes    Problems with swallowing    Stool incontinence    Unspecified hemorrhoids with other complication    with bleeding    Vitamin D deficiency              Past Surgical History:   Procedure Laterality Date    Appendectomy   2009    Ruptured    Appendectomy  2008    ruptured and removed    Colonoscopy  10/28/2005    Recheck 10 years    Colonoscopy  2015    Hysterectomy  1998    Vaginal hysterectomy for prolapsed uterus, ovaries intact          X2    Other surgical history  2010    nerve block transforaminal epidural lumbar L3-L4    Tonsillectomy      with adenoidectomy    Total abdom hysterectomy      vaginal/ ovaries not removed                Social History     Socioeconomic History    Marital status:     Number of children: 2   Occupational History    Occupation: retired coordinator      Comment: [employer]   Tobacco Use    Smoking status: Never    Smokeless tobacco: Never   Vaping Use    Vaping status: Never Used   Substance and Sexual Activity    Alcohol use: Yes     Alcohol/week: 0.0 standard drinks of alcohol     Comment: seldom    Drug use: No   Other Topics Concern    Caffeine Concern Yes    Exercise Yes     Comment: 3-5x wk     Social Drivers of Health      Received from [facility]    Housing Stability              Review of Systems   Constitutional: Negative.    HENT: Negative.     Eyes: Negative.    Respiratory: Negative.     Cardiovascular: Negative.    Gastrointestinal: Negative.    Endocrine: Negative.    Genitourinary: Negative.    Musculoskeletal: Negative.    Skin:  Positive for rash.   Allergic/Immunologic: Negative.    Neurological: Negative.    Hematological: Negative.    Psychiatric/Behavioral: Negative.         Positive for stated complaint: rash  Other systems are as noted in HPI.  Constitutional and vital signs reviewed.      All other systems reviewed and negative except as noted above.                  Physical Exam    ED Triage Vitals [25 0940]   /61   Pulse 77   Resp 18   Temp 98.3 °F (36.8 °C)   Temp src Oral   SpO2 96 %   O2 Device None (Room air)       Current Vitals:   Vital Signs  BP: 143/61  Pulse: 77  Resp: 18  Temp:  98.3 °F (36.8 °C)  Temp src: Oral    Oxygen Therapy  SpO2: 96 %  O2 Device: None (Room air)            Physical Exam  Vitals and nursing note reviewed.   Constitutional:       Appearance: She is normal weight.   HENT:      Right Ear: External ear normal.      Left Ear: External ear normal.   Eyes:      Extraocular Movements: Extraocular movements intact.      Conjunctiva/sclera: Conjunctivae normal.      Pupils: Pupils are equal, round, and reactive to light.   Pulmonary:      Effort: Pulmonary effort is normal.   Musculoskeletal:      Cervical back: Normal range of motion and neck supple.   Skin:     Findings: Rash present.      Comments: Red vesicular lesion appreciated under the bra line over the lateral aspect of the right torso.   Neurological:      Mental Status: She is alert.   Psychiatric:         Mood and Affect: Mood normal.               ED Course  Labs Reviewed - No data to display                         MDM        Darline Acevedo is an 84 year old female who presents with a vesicular rash, suspecting shingles.    She noticed a spot on her body on Monday, initially thinking it was a bug bite due to itchiness. The spot has since increased in size, leading her to suspect shingles.    She has received shingles vaccinations in the past, which she believes might have lessened the severity of the outbreak. She recalls having a similar outbreak approximately thirty years ago below her jawline, in the same dermatome.    The rash is located in an area where her bra might rub, causing discomfort, especially when wearing a bra. She had to remove it while watching TV due to irritation. She has been using calamine lotion for relief and is considering wearing a cotton nightgown without a bra to avoid irritation.  Vital signs: Please see EMR.  Physical exam: Please see exam.  Differential diagnosis: Shingles, dermatitis, cellulitis.  Based on physical exam and HPI will diagnosed with shingles and prescribed Valtrex.   Patient to follow-up with primary care provider.  ED precautions given.        Medical Decision Making     Darline Acevedo is an 84 year old female who presents with a vesicular rash, suspecting shingles.    She noticed a spot on her body on Monday, initially thinking it was a bug bite due to itchiness. The spot has since increased in size, leading her to suspect shingles.    She has received shingles vaccinations in the past, which she believes might have lessened the severity of the outbreak. She recalls having a similar outbreak approximately thirty years ago below her jawline, in the same dermatome.    The rash is located in an area where her bra might rub, causing discomfort, especially when wearing a bra. She had to remove it while watching TV due to irritation. She has been using calamine lotion for relief and is considering wearing a cotton nightgown without a bra to avoid irritation.      Problems Addressed:  Herpes zoster without complication: acute illness or injury    Risk  Prescription drug management.        Disposition and Plan     Clinical Impression:  1. Herpes zoster without complication         Disposition:  Discharge  7/23/2025  9:55 am    Follow-up:  Simón House MD  [address] [place] [id number]  [phone]    Schedule an appointment as soon as possible for a visit             Medications Prescribed:  Current Discharge Medication List        START taking these medications    Details   valACYclovir 1 G Oral Tab Take 1 tablet (1,000 mg total) by mouth 3 (three) times daily for 7 days.  Qty: 21 tablet, Refills: 0                   Supplementary Documentation:

## 2025-07-23 NOTE — DISCHARGE INSTRUCTIONS
VISIT SUMMARY:  You came in today because of a vesicular rash that you suspected might be shingles. You noticed the rash on Monday and initially thought it was a bug bite. The rash has since grown, and you recalled having a similar outbreak about thirty years ago. The rash is causing discomfort, especially when wearing a bra, and you have been using calamine lotion for relief.    YOUR PLAN:  -SHINGLES: Shingles is a viral infection that causes a painful rash, usually in a single stripe on one side of the body. It is caused by the same virus that causes chickenpox. We have prescribed valacyclovir to help shorten the duration of the illness. You should continue using calamine lotion for relief and consider taking oatmeal baths to soothe your skin. Avoid wearing a bra to reduce irritation and wear a cotton nightgown instead. Wash your hands thoroughly after touching the lesions to prevent bacterial infection, and change your sheets frequently until the vesicles rupture. Follow up with your family doctor to monitor your progress.    INSTRUCTIONS:  Please follow up with your family doctor to monitor your progress. Make sure to wash your hands thoroughly after touching the lesions and change your sheets frequently until the vesicles rupture.    Contains text generated by Carlos

## (undated) DIAGNOSIS — Z91.89 10 YEAR RISK OF MI OR STROKE 7.5% OR GREATER: ICD-10-CM

## (undated) DIAGNOSIS — I10 ESSENTIAL HYPERTENSION: Primary | ICD-10-CM

## (undated) DIAGNOSIS — N39.41 URGE INCONTINENCE: ICD-10-CM

## (undated) NOTE — LETTER
[address]  [id number]  [place] [address]  [place]: [phone]  FAX: [phone]   Consent to Procedure/Sedation    Date: __9/11/2019_____    Time: ___8:36 AM ___    1.  I authorize the performance ___________________________    Signature of person authorized to consent for patient: Relationship to patient:  ___________________________    ___________________    Witness: ____________________     Date: ______________    Printed: 9/11/2019   8:36 AM

## (undated) NOTE — LETTER
Date: 5/4/2023    Patient Name: Zulma Pickluz wearing splint at all times until 5/15/2023. From 5/15/23 to 5/29/23 where the splint only at nighttime. After 5/29/2023 you can discontinue the splint        Sincerely,      Chase Broussard MD  Hand, Wrist, & Elbow Surgery  [facility]  [facility] [address], [address] [facility], [place] [address] org  [email or web address]  t: [phone]  f: [phone]

## (undated) NOTE — MR AVS SNAPSHOT
[address]  [address] [address] [phone]               Thank you for choosing us for your health care visit with Abdelrahman Jimenez MD.  We are glad to serve you and happy to provide you with this galeana Eating has a big impact on your heart health. In fact, eating healthier can improve several of your heart risks at once. For instance, it helps you manage weight, cholesterol, and blood pressure.  Here are ideas to help you make heart-healthy changes withou may have different recommendations than what is listed here:  · Fruits and vegetable provide plenty of nutrients without a lot of calories. At meals, fill half your plate with these foods.  Split the other half of your plate between whole grains and lean pr salad dressings, and vinegar. For salt-free herbs and spices, try basil, cilantro, cinnamon, pepper, and rosemary. Date Last Reviewed: 6/25/2015  © 6679-0303 [address], [address] [place] [address]. All rights reserved.  This i Summaries. If you've been to the Emergency Department or your doctor's office, you can view your past visit information in [employer] by going to Visits < Visit Summaries. [employer] questions? Call [phone] for help.   [employer] is NOT to be used for urge

## (undated) NOTE — Clinical Note
Marylu Solitario presents with mixed urinary incontinence. On exam, she has cystocele which may contribute to her sx. I'll started with UDS and follow those results while working to manage her sx. I appreciate the opportunity to participate in her care.  Thanks, Dulce Mackay